# Patient Record
Sex: MALE | Race: WHITE | URBAN - METROPOLITAN AREA
[De-identification: names, ages, dates, MRNs, and addresses within clinical notes are randomized per-mention and may not be internally consistent; named-entity substitution may affect disease eponyms.]

---

## 2017-01-03 RX ORDER — ALPRAZOLAM 0.5 MG/1
TABLET ORAL
Qty: 60 TAB | Refills: 1 | OUTPATIENT
Start: 2017-01-03 | End: 2017-03-06 | Stop reason: SDUPTHER

## 2017-01-04 RX ORDER — LITHIUM CARBONATE 300 MG/1
600 CAPSULE ORAL
Qty: 60 CAP | Refills: 2 | Status: SHIPPED | OUTPATIENT
Start: 2017-01-04 | End: 2017-04-02 | Stop reason: SDUPTHER

## 2017-01-12 RX ORDER — ZOLPIDEM TARTRATE 10 MG/1
TABLET ORAL
Qty: 30 TAB | Refills: 1 | OUTPATIENT
Start: 2017-01-12 | End: 2017-03-11 | Stop reason: SDUPTHER

## 2017-02-01 ENCOUNTER — OFFICE VISIT (OUTPATIENT)
Dept: BEHAVIORAL/MENTAL HEALTH CLINIC | Age: 52
End: 2017-02-01

## 2017-02-01 VITALS — SYSTOLIC BLOOD PRESSURE: 137 MMHG | WEIGHT: 266 LBS | DIASTOLIC BLOOD PRESSURE: 73 MMHG | HEART RATE: 78 BPM

## 2017-02-01 DIAGNOSIS — F43.10 PTSD (POST-TRAUMATIC STRESS DISORDER): ICD-10-CM

## 2017-02-01 DIAGNOSIS — Z63.4 GRIEF AT LOSS OF CHILD: ICD-10-CM

## 2017-02-01 DIAGNOSIS — F43.21 GRIEF AT LOSS OF CHILD: ICD-10-CM

## 2017-02-01 DIAGNOSIS — F31.71 BIPOLAR DISORDER, IN PARTIAL REMISSION, MOST RECENT EPISODE HYPOMANIC (HCC): Primary | ICD-10-CM

## 2017-02-01 SDOH — SOCIAL STABILITY - SOCIAL INSECURITY: DISSAPEARANCE AND DEATH OF FAMILY MEMBER: Z63.4

## 2017-02-01 NOTE — MR AVS SNAPSHOT
Visit Information Date & Time Provider Department Dept. Phone Encounter #  
 2/1/2017  2:00 PM Jorge Mcqueen NP [address] [facility] [phone] [id number] Follow-up Instructions Return in about 3 months (around 5/1/2017). Upcoming Health Maintenance Date Due DTaP/Tdap/Td series (1 - Tdap) 1/16/1987 FOBT Q 1 YEAR AGE 50-75 1/16/2016 INFLUENZA AGE 9 TO ADULT 8/1/2016 Allergies as of 2/1/2017  Review Complete On: 2/1/2017 By: Jorge Mcqueen NP Severity Noted Reaction Type Reactions Tetracycline High 05/19/2015    Swelling Current Immunizations  Never Reviewed No immunizations on file. Not reviewed this visit You Were Diagnosed With   
  
 Codes Comments Bipolar disorder, in partial remission, most recent episode hypomanic ([facility] [address]    -  Primary ICD-10-CM: F31.71 
ICD-9-CM: 296.80 PTSD (post-traumatic stress disorder)     ICD-10-CM: F43.10 ICD-9-CM: 309.81 Grief at loss of child     ICD-10-CM: F43.21, Z63.4 ICD-9-CM: 309.0 Vitals BP Pulse Weight(growth percentile) Smoking Status 137/73 78 266 lb (120.7 kg) Never Smoker Vitals History Preferred Pharmacy Pharmacy Name Phone [facility] [address] [facility] [phone] Your Updated Medication List  
  
   
This list is accurate as of: 2/1/17  2:41 PM.  Always use your most recent med list.  
  
  
  
  
 ADVAIR DISKUS 100-50 mcg/dose diskus inhaler Generic drug:  fluticasone-salmeterol Take 1 Puff by inhalation every twelve (12) hours. ALPRAZolam 0.5 mg tablet Commonly known as:  XANAX  
TAKE ONE TABLET BY MOUTH TWICE DAILY AS NEEDED CLARITIN 10 mg tablet Generic drug:  loratadine Take 10 mg by mouth daily. CRESTOR 40 mg tablet Generic drug:  rosuvastatin Take 40 mg by mouth nightly. DULoxetine 60 mg capsule Commonly known as:  CYMBALTA TAKE ONE CAPSULE BY MOUTH TWICE DAILY DURAGESIC 50 mcg/hr PATCH Generic drug:  fentaNYL  
1 Patch by TransDERmal route every fourty-eight (48) hours. IMITREX 100 mg tablet Generic drug:  SUMAtriptan Take 100 mg by mouth once as needed for Migraine. lithium carbonate 300 mg capsule Take 2 Caps by mouth nightly. LYRICA 150 mg capsule Generic drug:  pregabalin Take 150 mg by mouth three (3) times daily. metoprolol tartrate 25 mg tablet Commonly known as:  LOPRESSOR Take  by mouth daily. MIRALAX 17 gram/dose powder Generic drug:  polyethylene glycol Take 17 g by mouth daily as needed. nitroglycerin 0.4 mg SL tablet Commonly known as:  NITROSTAT  
by SubLINGual route every five (5) minutes as needed for Chest Pain. oxyCODONE IR 15 mg immediate release tablet Commonly known as:  OXY-IR Take 15 mg by mouth three (3) times daily as needed for Pain. QUEtiapine  mg sr tablet Commonly known as:  SEROquel XR Take 1 Tab by mouth nightly. SYNTHROID 125 mcg tablet Generic drug:  levothyroxine Take  by mouth Daily (before breakfast). testosterone cypionate 200 mg/mL injection Commonly known as:  DEPOTESTOTERONE CYPIONATE  
by IntraMUSCular route every fourteen (14) days. TOPAMAX 50 mg tablet Generic drug:  topiramate Take  by mouth two (2) times a day. VITAMIN D3 2,000 unit Tab Generic drug:  cholecalciferol (vitamin D3) Take  by mouth.  
  
 zolpidem 10 mg tablet Commonly known as:  AMBIEN  
TAKE 1 TABLET BY MOUTH AT BEDTIME AS NEEDED Follow-up Instructions Return in about 3 months (around 5/1/2017). Introducing [facility]! Rachel Heredia introduces PWA patient portal. Now you can access parts of your medical record, email your doctor's office, and request medication refills online. 1. In your internet browser, go to [email or web address] 2. Click on the First Time User? Click Here link in the Sign In box. You will see the New Member Sign Up page. 3. Enter your [employer] Access Code exactly as it appears below. You will not need to use this code after youve completed the sign-up process. If you do not sign up before the expiration date, you must request a new code. · [employer] Access Code: [email or web address] Expires: 3/7/2017  3:29 PM 
 
4. Enter the last four digits of your Social Security Number (xxxx) and Date of Birth (mm/dd/yyyy) as indicated and click Submit. You will be taken to the next sign-up page. 5. Create a [employer] ID. This will be your [employer] login ID and cannot be changed, so think of one that is secure and easy to remember. 6. Create a [employer] password. You can change your password at any time. 7. Enter your Password Reset Question and Answer. This can be used at a later time if you forget your password. 8. Enter your e-mail address. You will receive e-mail notification when new information is available in [address] 9. Click Sign Up. You can now view and download portions of your medical record. 10. Click the Download Summary menu link to download a portable copy of your medical information. If you have questions, please visit the Frequently Asked Questions section of the [employer] website. Remember, [employer] is NOT to be used for urgent needs. For medical emergencies, dial 911. Now available from your iPhone and Android! Please provide this summary of care documentation to your next provider. If you have any questions after today's visit, please call [phone].

## 2017-02-01 NOTE — PROGRESS NOTES
CHIEF COMPLAINT:  Kenisha Shane is a 47 y.o. male and was seen today for follow-up of psychiatric condition and psychotropic medication management. HPI:    Tong Tran reports the following psychiatric symptoms:  depression, agitation, anxiety and hypomania. The symptoms have been present for months and are of moderate/high severity. The symptoms occur daily. Pt with sig psychosocial stressors that has been increasing symptoms. Pt wanting to discuss tx plan. FAMILY/SOCIAL HX: special needs son with seizures, random neck pain (go in on the 8th to see if he needs surgery)     REVIEW OF SYSTEMS:  Psychiatric:  depression, anxiety, hypomania  Appetite:no change from normal   Sleep: poor with DIMS (difficulty initiating & maintaining sleep) benefits with ambien   Neuro: Pain and tremors   MSK; arthritis     Visit Vitals    /73    Pulse 78    Wt 120.7 kg (266 lb)       Side Effects:  none    MENTAL STATUS EXAM:   Sensorium  oriented to time, place and person   Relations cooperative   Appearance:  age appropriate and casually dressed   Motor Behavior:  within normal limits   Speech:  loud and pressured   Thought Process: tangential   Thought Content free of delusions and free of hallucinations   Suicidal ideations no plan  and no intention   Homicidal ideations no plan  and no intention   Mood:  angry, anxious, depressed and irritable   Affect:  anxious   Memory recent  adequate   Memory remote:  adequate   Concentration:  adequate   Abstraction:  abstract   Insight:  good   Reliability good   Judgment:  fair and good     MEDICAL DECISION MAKING:  Problems addressed today:    ICD-10-CM ICD-9-CM    1. Bipolar disorder, in partial remission, most recent episode hypomanic ([facility] [address] F31.71 296.80    2. PTSD (post-traumatic stress disorder) F43.10 309.81    3. Grief at loss of child F43.21 309.0     Z63.4         Assessment:   Tong Tran is responding to treatment plan overall, even with sig psychosocial stressors.  Pt is tolerating increase in seroquel well. Discussed getting lithium level done 8-12 hours post dose, pt given lab labels. Pt lost lab labels and requested new labels. Pt reported cutting back on xanax use to 3-4 times a week and has seen is memory has gotten better. Pt also has been trying to decrease use of ambien and has been using a half a tab instead of 1 tab. Mp Wahll his son 2 year death anniversary and is wondering if this in addition to other son who has been having seizures and had to go to the same hospital that his other son passed away has increased his symptoms. Pt is wondering if he should go back to every week of ind therapy vs every other week. Pt discussed that his therapist talks a lot about psychodynamic issues but wondering if he could focus more on present coping strategies to help with all the stressful situations that are going in life currently. Provided support and encouragement. Plan:   1. Current Outpatient Prescriptions   Medication Sig Dispense Refill    zolpidem (AMBIEN) 10 mg tablet TAKE 1 TABLET BY MOUTH AT BEDTIME AS NEEDED 30 Tab 1    lithium carbonate 300 mg capsule Take 2 Caps by mouth nightly. 60 Cap 2    ALPRAZolam (XANAX) 0.5 mg tablet TAKE ONE TABLET BY MOUTH TWICE DAILY AS NEEDED 60 Tab 1    DULoxetine (CYMBALTA) 60 mg capsule TAKE ONE CAPSULE BY MOUTH TWICE DAILY  60 Cap 2    QUEtiapine SR (SEROQUEL XR) 400 mg sr tablet Take 1 Tab by mouth nightly. 30 Tab 2    topiramate (TOPAMAX) 50 mg tablet Take  by mouth two (2) times a day.  fluticasone-salmeterol (ADVAIR DISKUS) 100-50 mcg/dose diskus inhaler Take 1 Puff by inhalation every twelve (12) hours.  loratadine (CLARITIN) 10 mg tablet Take 10 mg by mouth daily.  fentaNYL (DURAGESIC) 50 mcg/hr PATCH 1 Patch by TransDERmal route every fourty-eight (48) hours.  pregabalin (LYRICA) 150 mg capsule Take 150 mg by mouth three (3) times daily.       oxyCODONE IR (OXY-IR) 15 mg immediate release tablet Take 15 mg by mouth three (3) times daily as needed for Pain.  testosterone cypionate (DEPOTESTOTERONE CYPIONATE) 200 mg/mL injection by IntraMUSCular route every fourteen (14) days.  levothyroxine (SYNTHROID) 125 mcg tablet Take  by mouth Daily (before breakfast).  cholecalciferol, vitamin D3, (VITAMIN D3) 2,000 unit tab Take  by mouth.  metoprolol (LOPRESSOR) 25 mg tablet Take  by mouth daily.  rosuvastatin (CRESTOR) 40 mg tablet Take 40 mg by mouth nightly.  SUMAtriptan (IMITREX) 100 mg tablet Take 100 mg by mouth once as needed for Migraine.  polyethylene glycol (MIRALAX) 17 gram/dose powder Take 17 g by mouth daily as needed.  nitroglycerin (NITROSTAT) 0.4 mg SL tablet by SubLINGual route every five (5) minutes as needed for Chest Pain.            medication changes made today: cont seroquel 400mg daily for mood stabalization, cont lithium 600mg for mood stabilization, cont xanax 0.5mg daily PRN anxiety , cont ambien 10mg PRN sleep    2. Counseling and coordination of care including instructions for treatment, risks/benefits, risk factor reduction and patient/family education. He agrees with the plan. Patient instructed to call with any side effects, questions or issues. 3.  Follow-up Disposition:  Return in about 3 months (around 5/1/2017). 4. ind therapy every other week, possibly every week     5.  Lithium level     2/1/2017  Neil Marin NP

## 2017-03-07 RX ORDER — ALPRAZOLAM 0.5 MG/1
TABLET ORAL
Qty: 60 TAB | Refills: 0 | OUTPATIENT
Start: 2017-03-07 | End: 2017-04-13 | Stop reason: SDUPTHER

## 2017-03-07 RX ORDER — QUETIAPINE 400 MG/1
400 TABLET, FILM COATED, EXTENDED RELEASE ORAL
Qty: 30 TAB | Refills: 2 | Status: SHIPPED | OUTPATIENT
Start: 2017-03-07 | End: 2017-06-02 | Stop reason: SDUPTHER

## 2017-03-08 ENCOUNTER — TELEPHONE (OUTPATIENT)
Dept: BEHAVIORAL/MENTAL HEALTH CLINIC | Age: 52
End: 2017-03-08

## 2017-03-08 NOTE — TELEPHONE ENCOUNTER
Padma Cuadra at the [facility], called and left VM yesterday evening. Said they received the refill for patient but wanted to verify something with the provider. [phone] is their #.

## 2017-03-13 RX ORDER — ZOLPIDEM TARTRATE 10 MG/1
TABLET ORAL
Qty: 30 TAB | Refills: 1 | OUTPATIENT
Start: 2017-03-13 | End: 2017-05-06 | Stop reason: SDUPTHER

## 2017-03-20 RX ORDER — DULOXETIN HYDROCHLORIDE 60 MG/1
CAPSULE, DELAYED RELEASE ORAL
Qty: 60 CAP | Refills: 0 | Status: SHIPPED | OUTPATIENT
Start: 2017-03-20 | End: 2017-04-19 | Stop reason: SDUPTHER

## 2017-04-03 RX ORDER — LITHIUM CARBONATE 300 MG/1
CAPSULE ORAL
Qty: 60 CAP | Refills: 0 | Status: SHIPPED | OUTPATIENT
Start: 2017-04-03 | End: 2017-05-07 | Stop reason: SDUPTHER

## 2017-04-17 RX ORDER — ALPRAZOLAM 0.5 MG/1
TABLET ORAL
Qty: 60 TAB | Refills: 0 | OUTPATIENT
Start: 2017-04-17 | End: 2017-05-31 | Stop reason: SDUPTHER

## 2017-05-08 RX ORDER — LITHIUM CARBONATE 300 MG/1
CAPSULE ORAL
Qty: 60 CAP | Refills: 2 | Status: SHIPPED | OUTPATIENT
Start: 2017-05-08 | End: 2017-08-01 | Stop reason: SDUPTHER

## 2017-05-08 RX ORDER — ZOLPIDEM TARTRATE 10 MG/1
10 TABLET ORAL
Qty: 30 TAB | Refills: 1 | OUTPATIENT
Start: 2017-05-12 | End: 2017-07-12 | Stop reason: SDUPTHER

## 2017-05-31 RX ORDER — ALPRAZOLAM 0.5 MG/1
TABLET ORAL
Qty: 60 TAB | Refills: 1 | OUTPATIENT
Start: 2017-05-31 | End: 2017-08-10 | Stop reason: SDUPTHER

## 2017-06-05 RX ORDER — QUETIAPINE 400 MG/1
TABLET, FILM COATED, EXTENDED RELEASE ORAL
Qty: 30 TAB | Refills: 0 | Status: SHIPPED | OUTPATIENT
Start: 2017-06-05 | End: 2017-07-02 | Stop reason: SDUPTHER

## 2017-07-03 RX ORDER — QUETIAPINE 400 MG/1
TABLET, FILM COATED, EXTENDED RELEASE ORAL
Qty: 30 TAB | Refills: 0 | Status: SHIPPED | OUTPATIENT
Start: 2017-07-03 | End: 2017-08-01 | Stop reason: SDUPTHER

## 2017-07-12 RX ORDER — ZOLPIDEM TARTRATE 10 MG/1
10 TABLET ORAL
Qty: 30 TAB | Refills: 2 | OUTPATIENT
Start: 2017-07-12 | End: 2017-10-11 | Stop reason: SDUPTHER

## 2017-07-25 RX ORDER — LITHIUM CARBONATE 300 MG/1
CAPSULE ORAL
Qty: 60 CAP | Refills: 0 | OUTPATIENT
Start: 2017-07-25

## 2017-07-25 RX ORDER — ALPRAZOLAM 0.5 MG/1
TABLET ORAL
Qty: 60 TAB | Refills: 0 | OUTPATIENT
Start: 2017-07-25

## 2017-08-02 RX ORDER — LITHIUM CARBONATE 300 MG/1
CAPSULE ORAL
Qty: 60 CAP | Refills: 2 | Status: SHIPPED | OUTPATIENT
Start: 2017-08-02 | End: 2017-10-11 | Stop reason: SDUPTHER

## 2017-08-02 RX ORDER — QUETIAPINE 400 MG/1
TABLET, FILM COATED, EXTENDED RELEASE ORAL
Qty: 30 TAB | Refills: 2 | Status: SHIPPED | OUTPATIENT
Start: 2017-08-02 | End: 2017-10-11 | Stop reason: SDUPTHER

## 2017-08-10 ENCOUNTER — OFFICE VISIT (OUTPATIENT)
Dept: BEHAVIORAL/MENTAL HEALTH CLINIC | Age: 52
End: 2017-08-10

## 2017-08-10 VITALS
HEART RATE: 92 BPM | DIASTOLIC BLOOD PRESSURE: 76 MMHG | SYSTOLIC BLOOD PRESSURE: 103 MMHG | WEIGHT: 256 LBS | BODY MASS INDEX: 31.17 KG/M2 | HEIGHT: 76 IN

## 2017-08-10 DIAGNOSIS — F31.71 BIPOLAR DISORDER, IN PARTIAL REMISSION, MOST RECENT EPISODE HYPOMANIC (HCC): Primary | ICD-10-CM

## 2017-08-10 DIAGNOSIS — F43.10 PTSD (POST-TRAUMATIC STRESS DISORDER): ICD-10-CM

## 2017-08-10 RX ORDER — TRAZODONE HYDROCHLORIDE 50 MG/1
50 TABLET ORAL
Qty: 30 TAB | Refills: 1 | Status: SHIPPED | OUTPATIENT
Start: 2017-08-10 | End: 2017-10-11 | Stop reason: SDUPTHER

## 2017-08-10 RX ORDER — ALPRAZOLAM 0.5 MG/1
TABLET ORAL
Qty: 60 TAB | Refills: 1 | Status: SHIPPED | OUTPATIENT
Start: 2017-08-10 | End: 2017-10-11 | Stop reason: SDUPTHER

## 2017-08-10 NOTE — MR AVS SNAPSHOT
Visit Information Date & Time Provider Department Dept. Phone Encounter #  
 8/10/2017 11:30 AM Ramila Bryant NP [address] [facility] [phone] [id number] Follow-up Instructions Return in about 8 weeks (around 10/5/2017). Upcoming Health Maintenance Date Due DTaP/Tdap/Td series (1 - Tdap) 1/16/1987 FOBT Q 1 YEAR AGE 50-75 1/16/2016 INFLUENZA AGE 9 TO ADULT 8/1/2017 Allergies as of 8/10/2017  Review Complete On: 8/10/2017 By: Ramila Bryant NP Severity Noted Reaction Type Reactions Tetracycline High 05/19/2015    Swelling Current Immunizations  Never Reviewed No immunizations on file. Not reviewed this visit You Were Diagnosed With   
  
 Codes Comments Bipolar disorder, in partial remission, most recent episode hypomanic ([facility] [address]    -  Primary ICD-10-CM: F31.71 
ICD-9-CM: 296.80 PTSD (post-traumatic stress disorder)     ICD-10-CM: F43.10 ICD-9-CM: 309.81 Vitals BP Pulse Height(growth percentile) Weight(growth percentile) BMI Smoking Status 103/76 92 6' 4\" (1.93 m) 256 lb (116.1 kg) 31.16 kg/m2 Never Smoker BMI and BSA Data Body Mass Index Body Surface Area  
 31.16 kg/m 2 2.5 m 2 Preferred Pharmacy Pharmacy Name Phone [facility] [address] [place] [address] [phone] Your Updated Medication List  
  
   
This list is accurate as of: 8/10/17 12:16 PM.  Always use your most recent med list.  
  
  
  
  
 ADVAIR DISKUS 100-50 mcg/dose diskus inhaler Generic drug:  fluticasone-salmeterol Take 1 Puff by inhalation every twelve (12) hours. ALPRAZolam 0.5 mg tablet Commonly known as:  XANAX  
TAKE 1 TABLET BY MOUTH BID AS NEEDED FOR ANXIETY. DO NOT TAKE WITH OTHER BENZOS, NARCOTICS OR ALCOHOL. CLARITIN 10 mg tablet Generic drug:  loratadine Take 10 mg by mouth daily. CRESTOR 40 mg tablet Generic drug:  rosuvastatin Take 40 mg by mouth nightly. DULoxetine 60 mg capsule Commonly known as:  CYMBALTA TAKE 1 CAPSULE BY MOUTH 2 TIMES DAILY DURAGESIC 50 mcg/hr PATCH Generic drug:  fentaNYL  
1 Patch by TransDERmal route every fourty-eight (48) hours. IMITREX 100 mg tablet Generic drug:  SUMAtriptan Take 100 mg by mouth once as needed for Migraine. lithium carbonate 300 mg capsule TAKE TWO CAPSULES BY MOUTH DAILY AT BEDTIME  
  
 LYRICA 150 mg capsule Generic drug:  pregabalin Take 150 mg by mouth three (3) times daily. metoprolol tartrate 25 mg tablet Commonly known as:  LOPRESSOR Take  by mouth daily. MIRALAX 17 gram/dose powder Generic drug:  polyethylene glycol Take 17 g by mouth daily as needed. nitroglycerin 0.4 mg SL tablet Commonly known as:  NITROSTAT  
by SubLINGual route every five (5) minutes as needed for Chest Pain. oxyCODONE IR 15 mg immediate release tablet Commonly known as:  OXY-IR Take 15 mg by mouth three (3) times daily as needed for Pain. QUEtiapine  mg sr tablet Commonly known as:  SEROquel XR  
TAKE ONE TABLET BY MOUTH AT BEDTIME  
  
 SYNTHROID 125 mcg tablet Generic drug:  levothyroxine Take  by mouth Daily (before breakfast). testosterone cypionate 200 mg/mL injection Commonly known as:  DEPOTESTOTERONE CYPIONATE  
by IntraMUSCular route every fourteen (14) days. TOPAMAX 50 mg tablet Generic drug:  topiramate Take  by mouth two (2) times a day. traZODone 50 mg tablet Commonly known as:  [place] Odor Take 1 Tab by mouth nightly. May take a second tab if not asleep in 30 minutes. VITAMIN D3 2,000 unit Tab Generic drug:  cholecalciferol (vitamin D3) Take  by mouth.  
  
 zolpidem 10 mg tablet Commonly known as:  AMBIEN Take 1 Tab by mouth nightly as needed for Sleep. Max Daily Amount: 10 mg. Do not take with narcotic pain medication, other benzo's or alcohol. Prescriptions Printed Refills ALPRAZolam (XANAX) 0.5 mg tablet 1 Sig: TAKE 1 TABLET BY MOUTH BID AS NEEDED FOR ANXIETY. DO NOT TAKE WITH OTHER BENZOS, NARCOTICS OR ALCOHOL. Class: Print Prescriptions Sent to Pharmacy Refills  
 traZODone (DESYREL) 50 mg tablet 1 Sig: Take 1 Tab by mouth nightly. May take a second tab if not asleep in 30 minutes. Class: Normal  
 Pharmacy: [address], [place] [address] JOSE MIGUEL LARA [facility] #: [phone] Route: Oral  
  
Follow-up Instructions Return in about 8 weeks (around 10/5/2017). Introducing [facility]! [employer] introduces [employer] patient portal. Now you can access parts of your medical record, email your doctor's office, and request medication refills online. 1. In your internet browser, go to [email or web address] 2. Click on the First Time User? Click Here link in the Sign In box. You will see the New Member Sign Up page. 3. Enter your [employer] Access Code exactly as it appears below. You will not need to use this code after youve completed the sign-up process. If you do not sign up before the expiration date, you must request a new code. · [employer] Access Code: [email or web address] Expires: 11/8/2017 12:16 PM 
 
4. Enter the last four digits of your Social Security Number (xxxx) and Date of Birth (mm/dd/yyyy) as indicated and click Submit. You will be taken to the next sign-up page. 5. Create a [employer] ID. This will be your [employer] login ID and cannot be changed, so think of one that is secure and easy to remember. 6. Create a [employer] password. You can change your password at any time. 7. Enter your Password Reset Question and Answer. This can be used at a later time if you forget your password. 8. Enter your e-mail address. You will receive e-mail notification when new information is available in [address] 9. Click Sign Up. You can now view and download portions of your medical record. 10. Click the Download Summary menu link to download a portable copy of your medical information. If you have questions, please visit the Frequently Asked Questions section of the [employer] website. Remember, [employer] is NOT to be used for urgent needs. For medical emergencies, dial 911. Now available from your iPhone and Android! Please provide this summary of care documentation to your next provider. If you have any questions after today's visit, please call [phone].

## 2017-08-10 NOTE — PROGRESS NOTES
CHIEF COMPLAINT:  Maxx Cuello is a 47 y.o. male and was seen today for follow-up of psychiatric condition and psychotropic medication management. HPI:    Stephani Rios reports the following psychiatric symptoms:  depression, agitation, anxiety, anette and hypomania. The symptoms have been present for months and are of moderate/high severity. The symptoms occur daily and more frequently secondary to a recent stressor. Pt reports some increased depression and \"fogginess\". Pt here today to review current treatment plan. FAMILY/SOCIAL HX: daughter dating a young man with sig social issues and he has been staying with pt and his family    REVIEW OF SYSTEMS:  Psychiatric:  depression, agitation, anxiety, hypomania  Appetite:decreased   Sleep: does not feel rested and fitful, by hx does not sleep well   Neuro: chronic pain    Visit Vitals    /76    Pulse 92    Ht 6' 4\" (1.93 m)    Wt 116.1 kg (256 lb)    BMI 31.16 kg/m2       Side Effects:  none    MENTAL STATUS EXAM:   Sensorium  oriented to time, place and person   Relations cooperative   Appearance:  age appropriate and casually dressed   Motor Behavior:  gait stable and within normal limits   Speech:  thick, dry mouth   Thought Process: goal directed   Thought Content free of delusions and free of hallucinations   Suicidal ideations no intention   Homicidal ideations no intention   Mood:  anxious, irritable and sad   Affect:  Anxious, depressed and irritable   Memory recent  adequate   Memory remote:  adequate   Concentration:  adequate   Abstraction:  abstract   Insight:  fair   Reliability fair   Judgment:  fair     MEDICAL DECISION MAKING:  Problems addressed today:    ICD-10-CM ICD-9-CM    1. Bipolar [facility], in partial remission, [facility] F31.71 296.80    2. PTSD (post-traumatic stress disorder) F43.10 309.81        Assessment:   Stephani Rios is responding to treatment overall.  He currently has some breakthrough symptoms that are high severity. Reviewed current meds and will continue with lithium and seroquel. He reported noticing some cognitive dulling from use of ambien. Reviewed risk vs benefit and agreed at this time to [facility]aley's (will decrease to 5 mg for a few nights) and will switch to trazodone. Reviewed dosing options. Discussed weaning off of klonopin in the near future and pt agrees. Last lithium level was 2/17 and will check at next visit. It was within therapeutic range (low end) at 0.6 at that time. Pt discussed current stressors. Reviewed healthy boundaries and explored problem solving strategies. Provided support and encouragement. Pt had not been in to see his therapist but recently re-started. This is a source of support for him. Encouraged him to consider marital counseling. Note: pt shared that his daughter's bf (cause of significant stressors) has requested an appt at this office, agreed that I would not see him for a new pt eval as this would conflict with work we are doing     Plan:   1. Current Outpatient Prescriptions   Medication Sig Dispense Refill    ALPRAZolam (XANAX) 0.5 mg tablet TAKE 1 TABLET BY MOUTH BID AS NEEDED FOR ANXIETY. DO NOT TAKE WITH OTHER BENZOS, NARCOTICS OR ALCOHOL. 60 Tab 1    traZODone (DESYREL) 50 mg tablet Take 1 Tab by mouth nightly. May take a second tab if not asleep in 30 minutes. 30 Tab 1    QUEtiapine SR (SEROQUEL XR) 400 mg sr tablet TAKE ONE TABLET BY MOUTH AT BEDTIME  30 Tab 2    lithium carbonate 300 mg capsule TAKE TWO CAPSULES BY MOUTH DAILY AT BEDTIME  60 Cap 2    DULoxetine (CYMBALTA) 60 mg capsule TAKE 1 CAPSULE BY MOUTH 2 TIMES DAILY 60 Cap 2    zolpidem (AMBIEN) 10 mg tablet Take 1 Tab by mouth nightly as needed for Sleep. Max Daily Amount: 10 mg. Do not take with narcotic pain medication, other benzo's or alcohol. 30 Tab 2    topiramate (TOPAMAX) 50 mg tablet Take  by mouth two (2) times a day.       fluticasone-salmeterol (ADVAIR DISKUS) 100-50 mcg/dose diskus inhaler Take 1 Puff by inhalation every twelve (12) hours.  loratadine (CLARITIN) 10 mg tablet Take 10 mg by mouth daily.  fentaNYL (DURAGESIC) 50 mcg/hr PATCH 1 Patch by TransDERmal route every fourty-eight (48) hours.  pregabalin (LYRICA) 150 mg capsule Take 150 mg by mouth three (3) times daily.  oxyCODONE IR (OXY-IR) 15 mg immediate release tablet Take 15 mg by mouth three (3) times daily as needed for Pain.  testosterone cypionate (DEPOTESTOTERONE CYPIONATE) 200 mg/mL injection by IntraMUSCular route every fourteen (14) days.  levothyroxine (SYNTHROID) 125 mcg tablet Take  by mouth Daily (before breakfast).  cholecalciferol, vitamin D3, (VITAMIN D3) 2,000 unit tab Take  by mouth.  metoprolol (LOPRESSOR) 25 mg tablet Take  by mouth daily.  rosuvastatin (CRESTOR) 40 mg tablet Take 40 mg by mouth nightly.  SUMAtriptan (IMITREX) 100 mg tablet Take 100 mg by mouth once as needed for Migraine.  polyethylene glycol (MIRALAX) 17 gram/dose powder Take 17 g by mouth daily as needed.  nitroglycerin (NITROSTAT) 0.4 mg SL tablet by SubLINGual route every five (5) minutes as needed for Chest Pain.            medication changes made today: cont seroquel 400mg daily for mood stabalization, cont lithium 600mg for mood stabilization, cont xanax 0.5mg daily PRN anxiety , wean off ambien 10mg PRN sleep, add trazodone 50 mg prn sleep    2. Counseling and coordination of care including instructions for treatment, risks/benefits, risk factor reduction and patient/family education. He agrees with the plan. Patient instructed to call with any side effects, questions or issues. 3.  Follow-up Disposition:  Return in about 8 weeks (around 10/5/2017).      4. Ind therapy    8/10/2017  Dandre Feliciano NP

## 2017-10-11 ENCOUNTER — OFFICE VISIT (OUTPATIENT)
Dept: BEHAVIORAL/MENTAL HEALTH CLINIC | Age: 52
End: 2017-10-11

## 2017-10-11 VITALS
HEIGHT: 76 IN | HEART RATE: 80 BPM | DIASTOLIC BLOOD PRESSURE: 77 MMHG | WEIGHT: 252 LBS | BODY MASS INDEX: 30.69 KG/M2 | SYSTOLIC BLOOD PRESSURE: 132 MMHG

## 2017-10-11 DIAGNOSIS — F43.10 PTSD (POST-TRAUMATIC STRESS DISORDER): ICD-10-CM

## 2017-10-11 DIAGNOSIS — F31.71 BIPOLAR DISORDER, IN PARTIAL REMISSION, MOST RECENT EPISODE HYPOMANIC (HCC): Primary | ICD-10-CM

## 2017-10-11 RX ORDER — LITHIUM CARBONATE 300 MG/1
CAPSULE ORAL
Qty: 60 CAP | Refills: 2 | Status: SHIPPED | OUTPATIENT
Start: 2017-10-11 | End: 2018-01-10 | Stop reason: SDUPTHER

## 2017-10-11 RX ORDER — QUETIAPINE 400 MG/1
TABLET, FILM COATED, EXTENDED RELEASE ORAL
Qty: 30 TAB | Refills: 2 | Status: SHIPPED | OUTPATIENT
Start: 2017-10-11 | End: 2018-01-10 | Stop reason: SDUPTHER

## 2017-10-11 RX ORDER — ZOLPIDEM TARTRATE 10 MG/1
10 TABLET ORAL
Qty: 30 TAB | Refills: 1 | Status: SHIPPED | OUTPATIENT
Start: 2017-10-11 | End: 2018-01-10 | Stop reason: SDUPTHER

## 2017-10-11 RX ORDER — TRAZODONE HYDROCHLORIDE 150 MG/1
150 TABLET ORAL
Qty: 30 TAB | Refills: 1 | Status: SHIPPED | OUTPATIENT
Start: 2017-10-11 | End: 2018-01-10

## 2017-10-11 RX ORDER — DULOXETIN HYDROCHLORIDE 30 MG/1
30 CAPSULE, DELAYED RELEASE ORAL DAILY
Qty: 30 CAP | Refills: 2 | Status: SHIPPED | OUTPATIENT
Start: 2017-10-11 | End: 2018-01-10 | Stop reason: SDUPTHER

## 2017-10-11 RX ORDER — ALPRAZOLAM 0.5 MG/1
TABLET ORAL
Qty: 60 TAB | Refills: 1 | Status: SHIPPED | OUTPATIENT
Start: 2017-10-11 | End: 2017-12-20 | Stop reason: SDUPTHER

## 2017-10-11 RX ORDER — DULOXETIN HYDROCHLORIDE 60 MG/1
60 CAPSULE, DELAYED RELEASE ORAL DAILY
Qty: 60 CAP | Refills: 2 | Status: SHIPPED | OUTPATIENT
Start: 2017-10-11 | End: 2018-01-10 | Stop reason: SDUPTHER

## 2017-10-11 NOTE — MR AVS SNAPSHOT
Visit Information Date & Time Provider Department Dept. Phone Encounter #  
 10/11/2017  1:30 PM Charleen Alexander, NP [address] [facility] [phone] [id number] Follow-up Instructions Return in about 3 months (around 1/11/2018). Upcoming Health Maintenance Date Due DTaP/Tdap/Td series (1 - Tdap) 1/16/1987 FOBT Q 1 YEAR AGE 50-75 1/16/2016 INFLUENZA AGE 9 TO ADULT 8/1/2017 Allergies as of 10/11/2017  Review Complete On: 10/11/2017 By: Charleen Alexander NP Severity Noted Reaction Type Reactions Tetracycline High 05/19/2015    Swelling Current Immunizations  Never Reviewed No immunizations on file. Not reviewed this visit You Were Diagnosed With   
  
 Codes Comments Bipolar disorder, in partial remission, most recent episode hypomanic ([facility] [address]    -  Primary ICD-10-CM: F31.71 
ICD-9-CM: 296.80 PTSD (post-traumatic stress disorder)     ICD-10-CM: F43.10 ICD-9-CM: 309.81 Vitals BP Pulse Height(growth percentile) Weight(growth percentile) BMI Smoking Status 132/77 80 6' 4\" (1.93 m) 252 lb (114.3 kg) 30.67 kg/m2 Never Smoker BMI and BSA Data Body Mass Index Body Surface Area  
 30.67 kg/m 2 2.48 m 2 Preferred Pharmacy Pharmacy Name Phone [facility] [address], [address] [facility] [phone] Your Updated Medication List  
  
   
This list is accurate as of: 10/11/17  2:07 PM.  Always use your most recent med list.  
  
  
  
  
 ADVAIR DISKUS 100-50 mcg/dose diskus inhaler Generic drug:  fluticasone-salmeterol Take 1 Puff by inhalation every twelve (12) hours. ALPRAZolam 0.5 mg tablet Commonly known as:  XANAX  
TAKE 1 TABLET BY MOUTH BID AS NEEDED FOR ANXIETY. DO NOT TAKE WITH OTHER BENZOS, NARCOTICS OR ALCOHOL. CLARITIN 10 mg tablet Generic drug:  loratadine Take 10 mg by mouth daily. CRESTOR 40 mg tablet Generic drug:  rosuvastatin Take 40 mg by mouth nightly. * DULoxetine 60 mg capsule Commonly known as:  CYMBALTA Take 1 Cap by mouth daily. * DULoxetine 30 mg capsule Commonly known as:  CYMBALTA Take 1 Cap by mouth daily. Take with 60 mg cap for total dose of 90 mg. DURAGESIC 50 mcg/hr PATCH Generic drug:  fentaNYL  
1 Patch by TransDERmal route every fourty-eight (48) hours. IMITREX 100 mg tablet Generic drug:  SUMAtriptan Take 100 mg by mouth once as needed for Migraine. lithium carbonate 300 mg capsule TAKE TWO CAPSULES BY MOUTH DAILY AT BEDTIME  
  
 LYRICA 150 mg capsule Generic drug:  pregabalin Take 150 mg by mouth three (3) times daily. metoprolol tartrate 25 mg tablet Commonly known as:  LOPRESSOR Take  by mouth daily. MIRALAX 17 gram/dose powder Generic drug:  polyethylene glycol Take 17 g by mouth daily as needed. nitroglycerin 0.4 mg SL tablet Commonly known as:  NITROSTAT  
by SubLINGual route every five (5) minutes as needed for Chest Pain. oxyCODONE IR 15 mg immediate release tablet Commonly known as:  OXY-IR Take 15 mg by mouth three (3) times daily as needed for Pain. QUEtiapine  mg sr tablet Commonly known as:  SEROquel XR  
TAKE ONE TABLET BY MOUTH AT BEDTIME  
  
 SYNTHROID 125 mcg tablet Generic drug:  levothyroxine Take  by mouth Daily (before breakfast). testosterone cypionate 200 mg/mL injection Commonly known as:  DEPOTESTOTERONE CYPIONATE  
by IntraMUSCular route every fourteen (14) days. TOPAMAX 50 mg tablet Generic drug:  topiramate Take  by mouth two (2) times a day. traZODone 150 mg tablet Commonly known as:  Emre Solares Take 1 Tab by mouth nightly as needed for Sleep. May take a second tab if not asleep in 30 minutes. VITAMIN D3 2,000 unit Tab Generic drug:  cholecalciferol (vitamin D3) Take  by mouth.  
  
 zolpidem 10 mg tablet Commonly known as:  AMBIEN  
 Take 1 Tab by mouth nightly as needed for Sleep. Max Daily Amount: 10 mg. Do not take with narcotic pain medication, other benzo's or alcohol. * Notice: This list has 2 medication(s) that are the same as other medications prescribed for you. Read the directions carefully, and ask your doctor or other care provider to review them with you. Prescriptions Printed Refills ALPRAZolam (XANAX) 0.5 mg tablet 1 Sig: TAKE 1 TABLET BY MOUTH BID AS NEEDED FOR ANXIETY. DO NOT TAKE WITH OTHER BENZOS, NARCOTICS OR ALCOHOL. Class: Print  
 zolpidem (AMBIEN) 10 mg tablet 1 Sig: Take 1 Tab by mouth nightly as needed for Sleep. Max Daily Amount: 10 mg. Do not take with narcotic pain medication, other benzo's or alcohol. Class: Print Route: Oral  
  
Prescriptions Sent to Pharmacy Refills  
 traZODone (DESYREL) 150 mg tablet 1 Sig: Take 1 Tab by mouth nightly as needed for Sleep. May take a second tab if not asleep in 30 minutes. Class: Normal  
 Pharmacy: [address] Ph #: [phone] Route: Oral  
 QUEtiapine SR (SEROQUEL XR) 400 mg sr tablet 2 Sig: TAKE ONE TABLET BY MOUTH AT BEDTIME Class: Normal  
 Pharmacy: [address] [place] [address] Ph #: [phone]  
 lithium carbonate 300 mg capsule 2 Sig: TAKE TWO CAPSULES BY MOUTH DAILY AT BEDTIME Class: Normal  
 Pharmacy: [address] Ph #: [phone] DULoxetine (CYMBALTA) 60 mg capsule 2 Sig: Take 1 Cap by mouth daily. Class: Normal  
 Pharmacy: [address] Ph #: [phone] Route: Oral  
 DULoxetine (CYMBALTA) 30 mg capsule 2 Sig: Take 1 Cap by mouth daily.  Take with 60 mg cap for total dose of 90 mg.  
 Class: Normal  
 Pharmacy: [facility] [address] [place] [address], [address] [place] [address] Hwy  #: [phone] Route: Oral  
  
Follow-up Instructions Return in about 3 months (around 1/11/2018). Introducing [facility]! Mir Chavez introduces "[employer] patient portal. Now you can access parts of your medical record, email your doctor's office, and request medication refills online. 1. In your internet browser, go to [email or web address] 2. Click on the First Time User? Click Here link in the Sign In box. You will see the New Member Sign Up page. 3. Enter your "[employer] Access Code exactly as it appears below. You will not need to use this code after youve completed the sign-up process. If you do not sign up before the expiration date, you must request a new code. · "[employer] Access Code: [email or web address] Expires: 11/8/2017 12:16 PM 
 
4. Enter the last four digits of your Social Security Number (xxxx) and Date of Birth (mm/dd/yyyy) as indicated and click Submit. You will be taken to the next sign-up page. 5. Create a "[employer] ID. This will be your "[employer] login ID and cannot be changed, so think of one that is secure and easy to remember. 6. Create a "[employer] password. You can change your password at any time. 7. Enter your Password Reset Question and Answer. This can be used at a later time if you forget your password. 8. Enter your e-mail address. You will receive e-mail notification when new information is available in [address] 9. Click Sign Up. You can now view and download portions of your medical record. 10. Click the Download Summary menu link to download a portable copy of your medical information. If you have questions, please visit the Frequently Asked Questions section of the "[employer] website. Remember, "[employer] is NOT to be used for urgent needs. For medical emergencies, dial 911. Now available from your iPhone and Android! Please provide this summary of care documentation to your next provider. If you have any questions after today's visit, please call [phone].

## 2017-10-11 NOTE — PROGRESS NOTES
CHIEF COMPLAINT:  Maxine Stafford is a 47 y.o. male and was seen today for follow-up of psychiatric condition and psychotropic medication management. HPI:    Hali Tejada reports the following psychiatric symptoms:  depression, agitation, anxiety and hypomania. The symptoms have been present for months and are of moderate/high severity. Pt reports depression has been fairly stable. He has had ongoing anxiety. No evidence of hypomania/anette at this time. The anxiety symptoms occur more frequently at this time. Pt here to review current treatment plan. 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    Visit Vitals    /77    Pulse 80    Ht 6' 4\" (1.93 m)    Wt 114.3 kg (252 lb)    BMI 30.67 kg/m2       Side Effects:  none    MENTAL STATUS EXAM:   Sensorium  oriented to time, place and person   Relations cooperative   Appearance:  age appropriate and casually dressed   Motor Behavior:  gait stable and within normal limits   Speech:  normal volume   Thought Process: goal directed   Thought Content free of delusions and free of hallucinations   Suicidal ideations no intention   Homicidal ideations no intention   Mood:  anxious and within normal limits   Affect:  anxious and wnl   Memory recent  adequate   Memory remote:  adequate   Concentration:  adequate   Abstraction:  abstract   Insight:  fair and good   Reliability good and fair   Judgment:  fair and good     MEDICAL DECISION MAKING:  Problems addressed today:    ICD-10-CM ICD-9-CM    1. Bipolar disorder, in partial remission, most [facility] episode [facility] F31.71 296.80    2.  PTSD (post-traumatic stress disorder) F43.10 309.81        Assessment:   Hali Tejada is responding to treatment, symptoms are improving and fairly stable overall. Pt reports he has been considering reducing medications. Mood is stable and he is doing well with lithium, cymbalta and seroquel. Pt would like to try to wean off cymbalta (or at least lower dose) at this time. Agreed to decrease to 90 mg. Last lithium level on 2/24/17 was within therapeutic range at 0.6. If symptoms escalate with changes to cymbalta can increase lithium dose. He did switch trazodone for [employer]. He did not benefit from trazodone 50 mg so will try 150 mg. He likes trying to decrease use of ambien when he can so has been alternating trazodone with [employer]. He does not sleep as well with trazodone but will try higher dose. Discussed guidelines for safe use of benzo's with a fentanyl patch. Pt plans to d/w his pain specialist and/or pharmacist. He reports he is not using alprazolam as frequently. Pt discussed current stressors. He feels positive about enhanced coping strategies. Provided support and answered questions. Plan:   1. Current Outpatient Prescriptions   Medication Sig Dispense Refill    traZODone (DESYREL) 150 mg tablet Take 1 Tab by mouth nightly as needed for Sleep. May take a second tab if not asleep in 30 minutes. 30 Tab 1    ALPRAZolam (XANAX) 0.5 mg tablet TAKE 1 TABLET BY MOUTH BID AS NEEDED FOR ANXIETY. DO NOT TAKE WITH OTHER BENZOS, NARCOTICS OR ALCOHOL. 60 Tab 1    zolpidem (AMBIEN) 10 mg tablet Take 1 Tab by mouth nightly as needed for Sleep. Max Daily Amount: 10 mg. Do not take with narcotic pain medication, other benzo's or alcohol. 30 Tab 1    QUEtiapine SR (SEROQUEL XR) 400 mg sr tablet TAKE ONE TABLET BY MOUTH AT BEDTIME 30 Tab 2    lithium carbonate 300 mg capsule TAKE TWO CAPSULES BY MOUTH DAILY AT BEDTIME 60 Cap 2    DULoxetine (CYMBALTA) 60 mg capsule Take 1 Cap by mouth daily. 60 Cap 2    DULoxetine (CYMBALTA) 30 mg capsule Take 1 Cap by mouth daily.  Take with 60 mg cap for total dose of 90 mg. 30 Cap 2    topiramate (TOPAMAX) 50 mg tablet Take  by mouth two (2) times a day.  fluticasone-salmeterol (ADVAIR DISKUS) 100-50 mcg/dose diskus inhaler Take 1 Puff by inhalation every twelve (12) hours.  loratadine (CLARITIN) 10 mg tablet Take 10 mg by mouth daily.  fentaNYL (DURAGESIC) 50 mcg/hr PATCH 1 Patch by TransDERmal route every fourty-eight (48) hours.  pregabalin (LYRICA) 150 mg capsule Take 150 mg by mouth three (3) times daily.  oxyCODONE IR (OXY-IR) 15 mg immediate release tablet Take 15 mg by mouth three (3) times daily as needed for Pain.  testosterone cypionate (DEPOTESTOTERONE CYPIONATE) 200 mg/mL injection by IntraMUSCular route every fourteen (14) days.  levothyroxine (SYNTHROID) 125 mcg tablet Take  by mouth Daily (before breakfast).  cholecalciferol, vitamin D3, (VITAMIN D3) 2,000 unit tab Take  by mouth.  metoprolol (LOPRESSOR) 25 mg tablet Take  by mouth daily.  rosuvastatin (CRESTOR) 40 mg tablet Take 40 mg by mouth nightly.  SUMAtriptan (IMITREX) 100 mg tablet Take 100 mg by mouth once as needed for Migraine.  polyethylene glycol (MIRALAX) 17 gram/dose powder Take 17 g by mouth daily as needed.  nitroglycerin (NITROSTAT) 0.4 mg SL tablet by SubLINGual route every five (5) minutes as needed for Chest Pain.            medication changes made today: cont seroquel 400mg daily for mood stabalization, cont lithium 600mg for mood stabilization, cont xanax 0.5mg daily PRN anxiety , alternate ambien 10mg PRN sleep with trazodone, decrease cymbalta 90 mg    2. Counseling and coordination of care including instructions for treatment, risks/benefits, risk factor reduction and patient/family education. He agrees with the plan. Patient instructed to call with any side effects, questions or issues. 3.  Follow-up Disposition:  Return in about 3 months (around 1/11/2018). 4. Ind therapy    5.  Mood Disorder Questionnaire and HAM-A at next f/u visit    10/11/2017  Channing Alegria NP

## 2017-10-30 RX ORDER — TRAZODONE HYDROCHLORIDE 50 MG/1
TABLET ORAL
Qty: 30 TAB | Refills: 0 | OUTPATIENT
Start: 2017-10-30

## 2017-12-20 DIAGNOSIS — F31.71 BIPOLAR DISORDER, IN PARTIAL REMISSION, MOST RECENT EPISODE HYPOMANIC (HCC): Primary | ICD-10-CM

## 2017-12-21 RX ORDER — ALPRAZOLAM 0.5 MG/1
0.5 TABLET ORAL
Qty: 60 TAB | Refills: 1 | OUTPATIENT
Start: 2017-12-21 | End: 2018-02-16 | Stop reason: SDUPTHER

## 2018-01-10 ENCOUNTER — OFFICE VISIT (OUTPATIENT)
Dept: BEHAVIORAL/MENTAL HEALTH CLINIC | Age: 53
End: 2018-01-10

## 2018-01-10 VITALS
WEIGHT: 252 LBS | SYSTOLIC BLOOD PRESSURE: 118 MMHG | HEART RATE: 108 BPM | BODY MASS INDEX: 30.69 KG/M2 | HEIGHT: 76 IN | DIASTOLIC BLOOD PRESSURE: 77 MMHG

## 2018-01-10 DIAGNOSIS — F31.71 BIPOLAR DISORDER, IN PARTIAL REMISSION, MOST RECENT EPISODE HYPOMANIC (HCC): Primary | ICD-10-CM

## 2018-01-10 DIAGNOSIS — F43.10 PTSD (POST-TRAUMATIC STRESS DISORDER): ICD-10-CM

## 2018-01-10 DIAGNOSIS — F43.21 COMPLICATED GRIEF: ICD-10-CM

## 2018-01-10 RX ORDER — QUETIAPINE 400 MG/1
TABLET, FILM COATED, EXTENDED RELEASE ORAL
Qty: 30 TAB | Refills: 2 | Status: SHIPPED | OUTPATIENT
Start: 2018-01-10 | End: 2018-04-24 | Stop reason: SDUPTHER

## 2018-01-10 RX ORDER — DULOXETIN HYDROCHLORIDE 60 MG/1
60 CAPSULE, DELAYED RELEASE ORAL DAILY
Qty: 60 CAP | Refills: 2 | Status: SHIPPED | OUTPATIENT
Start: 2018-01-10 | End: 2018-07-10 | Stop reason: SDUPTHER

## 2018-01-10 RX ORDER — LITHIUM CARBONATE 300 MG/1
CAPSULE ORAL
Qty: 60 CAP | Refills: 2 | Status: SHIPPED | OUTPATIENT
Start: 2018-01-10 | End: 2018-04-10 | Stop reason: SDUPTHER

## 2018-01-10 RX ORDER — DULOXETIN HYDROCHLORIDE 30 MG/1
30 CAPSULE, DELAYED RELEASE ORAL DAILY
Qty: 30 CAP | Refills: 2 | Status: SHIPPED | OUTPATIENT
Start: 2018-01-10 | End: 2018-04-10 | Stop reason: SDUPTHER

## 2018-01-10 RX ORDER — ZOLPIDEM TARTRATE 10 MG/1
10 TABLET ORAL
Qty: 30 TAB | Refills: 1 | Status: SHIPPED | OUTPATIENT
Start: 2018-01-10 | End: 2018-03-13 | Stop reason: SDUPTHER

## 2018-01-10 NOTE — PROGRESS NOTES
CHIEF COMPLAINT:  Shelby Harmon is a 47 y.o. male and was seen today for follow-up of psychiatric condition and psychotropic medication management. HPI:    Clarence Florez reports the following psychiatric symptoms:  depression, anxiety, anette and hypomania. The symptoms have been present for months and are of moderate/high severity. The symptoms occur daily at this time and depression has been severe. Pt reports numerous stressors. Pt reports benefit from current medication. He states it has been hard to evaluate due to current stressors. Pt here today to review current treatment plan. FAMILY/SOCIAL HX: daughter (age 22) got  in secret    REVIEW OF SYSTEMS:  Psychiatric:  depression, anxiety, hypomania/anette  Appetite:decreased   Sleep: poor with DIMS (difficulty initiating & maintaining sleep)   Neuro: chronic pain    Visit Vitals    /77    Pulse (!) 108    Ht 6' 4\" (1.93 m)    Wt 114.3 kg (252 lb)    BMI 30.67 kg/m2       Side Effects:  none    MENTAL STATUS EXAM:   Sensorium  oriented to time, place and person   Relations cooperative   Appearance:  age appropriate and casually dressed   Motor Behavior:  gait stable and within normal limits   Speech:  normal volume   Thought Process: goal directed   Thought Content free of delusions and free of hallucinations   Suicidal ideations no intention currently, had some SI during height of stress from daughter   Homicidal ideations no intention   Mood:  anxious and depressed, pt reports some nights of hypomania and staying up doing hobby   Affect:  anxious and depressed   Memory recent  adequate   Memory remote:  adequate   Concentration:  adequate   Abstraction:  abstract   Insight:  fair and limited   Reliability fair   Judgment:  Fair and limited     MEDICAL DECISION MAKING:  Problems addressed today:    ICD-10-CM ICD-9-CM    1.  Bipolar disorder, in partial remission, most recent episode hypomanic ([facility]) F31.71 296.80 zolpidem (AMBIEN) 10 mg tablet      QUEtiapine SR (SEROQUEL XR) 400 mg sr tablet      lithium carbonate 300 mg capsule      DULoxetine (CYMBALTA) 60 mg capsule      DULoxetine (CYMBALTA) 30 mg capsule      LITHIUM      METABOLIC PANEL, COMPREHENSIVE      CBC W/O DIFF   2. PTSD (post-traumatic stress disorder) F43.10 309.81 zolpidem (AMBIEN) 10 mg tablet      QUEtiapine SR (SEROQUEL XR) 400 mg sr tablet      lithium carbonate 300 mg capsule      DULoxetine (CYMBALTA) 60 mg capsule      DULoxetine (CYMBALTA) 30 mg capsule      LITHIUM      METABOLIC PANEL, COMPREHENSIVE      CBC W/O DIFF   3. Complicated grief Q46.98 309.0     Z63.4         Assessment:   Teresa Ashley is responding to treatment overall. He has had some breakthrough depression, anxiety and hypomanic symptoms. Pt overall reports benefit from his long term treatment plan. Since treating pt hypomanic/manic symptoms have not been apparent but he reports episodes of getting involved in a hobby and staying up till early in am to complete tasks/projects. Unclear if pt has more of an atypical bipolar disorder. When he started treatment here at this office he was on current meds and he has not required inpatient hospitalization. He has had some periods of high stress with intermittent SI but typically he is able to return to baseline (baseline is mild/modreate levels of depression and anxiety). Pt did not do well with use of Trazodone so will DC. Reviewed current meds and no additional changes to scheduled meds required. Recently decreased Cymbalta dose but due to current severity of symptoms (mod to high) will not make any additional changes. Will get a lithium level and associated labs. Last lithium level on 2/24/17 was just in therapeutic range at 0.6. Pt has a past hx of severe trauma and has used benzo's for a long period of time. Have worked to reduce from daily use to PRN but pt typically experiences stressors and uses PRN's for anxiety management/coping.  Reviewed importance of using lowest effective dose and only PRN again today. Reviewed rationale for guidelines and risk of using with ongoing narcotic medications as well. Pt verbalized understanding and stated he would work on decreasing use. He is in ind therapy but has taken breaks in the past. He is getting re-started in Feb and will also be starting marital counseling which should help pt build coping strategies. Discussed my leaving this practice and a transition plan. He would like to continue treatment here and is interested in working with one of the physicians. Provided support and reinforced treatment plan. Plan:   1. Current Outpatient Prescriptions   Medication Sig Dispense Refill    zolpidem (AMBIEN) 10 mg tablet Take 1 Tab by mouth nightly as needed for Sleep. Max Daily Amount: 10 mg. Do not take with narcotic pain medication, other benzo's or alcohol. 30 Tab 1    QUEtiapine SR (SEROQUEL XR) 400 mg sr tablet TAKE ONE TABLET BY MOUTH AT BEDTIME 30 Tab 2    lithium carbonate 300 mg capsule TAKE TWO CAPSULES BY MOUTH DAILY AT BEDTIME 60 Cap 2    DULoxetine (CYMBALTA) 60 mg capsule Take 1 Cap by mouth daily. 60 Cap 2    DULoxetine (CYMBALTA) 30 mg capsule Take 1 Cap by mouth daily. Take with 60 mg cap for total dose of 90 mg. 30 Cap 2    ALPRAZolam (XANAX) 0.5 mg tablet Take 1 Tab by mouth two (2) times daily as needed for Anxiety (Do not take with other benzo's, narcotics or alcohol. ). Max Daily Amount: 1 mg. 60 Tab 1    topiramate (TOPAMAX) 50 mg tablet Take  by mouth two (2) times a day.  fluticasone-salmeterol (ADVAIR DISKUS) 100-50 mcg/dose diskus inhaler Take 1 Puff by inhalation every twelve (12) hours.  loratadine (CLARITIN) 10 mg tablet Take 10 mg by mouth daily.  fentaNYL (DURAGESIC) 50 mcg/hr PATCH 1 Patch by TransDERmal route every fourty-eight (48) hours.  pregabalin (LYRICA) 150 mg capsule Take 150 mg by mouth three (3) times daily.       oxyCODONE IR (OXY-IR) 15 mg immediate release tablet Take 15 mg by mouth three (3) times daily as needed for Pain.  testosterone cypionate (DEPOTESTOTERONE CYPIONATE) 200 mg/mL injection by IntraMUSCular route every fourteen (14) days.  levothyroxine (SYNTHROID) 125 mcg tablet Take  by mouth Daily (before breakfast).  cholecalciferol, vitamin D3, (VITAMIN D3) 2,000 unit tab Take  by mouth.  metoprolol (LOPRESSOR) 25 mg tablet Take  by mouth daily.  rosuvastatin (CRESTOR) 40 mg tablet Take 40 mg by mouth nightly.  SUMAtriptan (IMITREX) 100 mg tablet Take 100 mg by mouth once as needed for Migraine.  polyethylene glycol (MIRALAX) 17 gram/dose powder Take 17 g by mouth daily as needed.  nitroglycerin (NITROSTAT) 0.4 mg SL tablet by SubLINGual route every five (5) minutes as needed for Chest Pain.            medication changes made today: cont seroquel 400mg daily for mood stabalization, cont lithium 600mg for mood stabilization, cont xanax 0.5mg daily PRN anxiety , ambien 5-10mg PRN sleep, cont cymbalta 90 mg for depression    2. Counseling and coordination of care including instructions for treatment, risks/benefits, risk factor reduction and patient/family education. He agrees with the plan. Patient instructed to call with any side effects, questions or issues. 3.  Follow-up Disposition:  Return in about 3 months (around 4/10/2018). 4. Lithium levels and associated labs    5.  Ind therapy and Marital counseling    1/10/2018  Manuel Holman NP

## 2018-02-14 DIAGNOSIS — F31.71 BIPOLAR DISORDER, IN PARTIAL REMISSION, MOST RECENT EPISODE HYPOMANIC (HCC): ICD-10-CM

## 2018-02-14 RX ORDER — ALPRAZOLAM 0.5 MG/1
TABLET ORAL
Qty: 60 TAB | Refills: 0 | OUTPATIENT
Start: 2018-02-14

## 2018-02-19 ENCOUNTER — DOCUMENTATION ONLY (OUTPATIENT)
Dept: BEHAVIORAL/MENTAL HEALTH CLINIC | Age: 53
End: 2018-02-19

## 2018-02-19 NOTE — PROGRESS NOTES
Pt requested refill today of PRN Alprazolam. Have been working with pt on using coping strategies vs using prn medication daily. Checked  and pt appears to continue to use daily. Will discuss at his next OV due to risk associated with concurrent narcotic use for chronic pain. Sandy Bautista

## 2018-03-13 DIAGNOSIS — F43.10 PTSD (POST-TRAUMATIC STRESS DISORDER): ICD-10-CM

## 2018-03-13 DIAGNOSIS — F31.71 BIPOLAR DISORDER, IN PARTIAL REMISSION, MOST RECENT EPISODE HYPOMANIC (HCC): ICD-10-CM

## 2018-03-14 RX ORDER — ZOLPIDEM TARTRATE 10 MG/1
TABLET ORAL
Qty: 30 TAB | Refills: 1 | OUTPATIENT
Start: 2018-03-14 | End: 2018-05-12 | Stop reason: SDUPTHER

## 2018-04-10 ENCOUNTER — OFFICE VISIT (OUTPATIENT)
Dept: BEHAVIORAL/MENTAL HEALTH CLINIC | Age: 53
End: 2018-04-10

## 2018-04-10 VITALS
HEART RATE: 81 BPM | WEIGHT: 247.8 LBS | HEIGHT: 76 IN | SYSTOLIC BLOOD PRESSURE: 143 MMHG | DIASTOLIC BLOOD PRESSURE: 74 MMHG | BODY MASS INDEX: 30.18 KG/M2

## 2018-04-10 DIAGNOSIS — F43.10 PTSD (POST-TRAUMATIC STRESS DISORDER): ICD-10-CM

## 2018-04-10 DIAGNOSIS — F43.21 COMPLICATED GRIEF: ICD-10-CM

## 2018-04-10 DIAGNOSIS — F31.71 BIPOLAR DISORDER, IN PARTIAL REMISSION, MOST RECENT EPISODE HYPOMANIC (HCC): Primary | ICD-10-CM

## 2018-04-10 RX ORDER — LITHIUM CARBONATE 300 MG/1
CAPSULE ORAL
Qty: 60 CAP | Refills: 2 | Status: SHIPPED | OUTPATIENT
Start: 2018-04-10 | End: 2018-07-10 | Stop reason: SDUPTHER

## 2018-04-10 RX ORDER — DULOXETIN HYDROCHLORIDE 30 MG/1
30 CAPSULE, DELAYED RELEASE ORAL DAILY
Qty: 30 CAP | Refills: 2 | Status: SHIPPED | OUTPATIENT
Start: 2018-04-10 | End: 2018-07-05 | Stop reason: SDUPTHER

## 2018-04-10 RX ORDER — ALPRAZOLAM 0.5 MG/1
TABLET ORAL
Qty: 60 TAB | Refills: 0 | Status: SHIPPED | OUTPATIENT
Start: 2018-04-10 | End: 2018-04-26 | Stop reason: SDUPTHER

## 2018-04-10 NOTE — PROGRESS NOTES
CHIEF COMPLAINT:  Tawny Ha is a 47 y.o. male and was seen today for follow-up of psychiatric condition and psychotropic medication management. HPI:    Dasia Hussein reports the following psychiatric symptoms:  depression and anxiety. The symptoms have been present for months and are of moderate severity. The symptoms occur somewhat less severely and less frequently. PHQ-9: 7, mild depression  HAM-A: 15, mild anxiety       FAMILY/SOCIAL HX: stressors r/t daughter's dysfunctional marriage    REVIEW OF SYSTEMS:  Psychiatric:  depression, anxiety  Appetite:improving   Sleep: improving   Neuro: chronic pain    Visit Vitals    /74    Pulse 81    Ht 6' 4\" (1.93 m)    Wt 112.4 kg (247 lb 12.8 oz)    BMI 30.16 kg/m2       Side Effects:  Sedation ? MENTAL STATUS EXAM:   Sensorium  oriented to time, place and person   Relations cooperative   Appearance:  age appropriate and casually dressed   Motor Behavior:  gait stable and within normal limits   Speech:  normal volume   Thought Process: goal directed   Thought Content free of delusions and free of hallucinations   Suicidal ideations no intention   Homicidal ideations no intention   Mood:  Anxious, depression   Affect:  anxious    Memory recent  adequate   Memory remote:  adequate   Concentration:  adequate   Abstraction:  abstract   Insight:  fair and good   Reliability good and fair   Judgment:  fair and good     MEDICAL DECISION MAKING:  Problems addressed today:    ICD-10-CM ICD-9-CM    1. Bipolar disorder, in partial remission, most recent episode hypomanic ([facility] [address] F31.71 296.80 ALPRAZolam (XANAX) 0.5 mg tablet      lithium carbonate 300 mg capsule      DULoxetine (CYMBALTA) 30 mg capsule   2. PTSD (post-traumatic stress disorder) F43.10 309.81 lithium carbonate 300 mg capsule      DULoxetine (CYMBALTA) 30 mg capsule   3. Complicated grief R47.09 309.0     Z63.4         Assessment:   Dasia Hussein is responding to treatment. Symptoms are improving overall. Pt reports he has had decreased severity of symptoms overall. Reviewed current medications and will continue current does except for seroquel. Pt will decrease to 300 mg and call if there is a drop in mood. Reviewed impact of concurrent use of narcotics and benzo's. Reviewed benzo safety and PRN guidelines. He states he has had to use ambien most nights but has decreased prn alprazolam use. Reviewed SE's of cognitive dulling and decreased energy/motivation. Pt discussed current stressors. Provided support and encouragement. Plan:   1. Current Outpatient Prescriptions   Medication Sig Dispense Refill    ALPRAZolam (XANAX) 0.5 mg tablet TAKE 1 TABLET BY MOUTH TWICE DAILY AS NEEDED FOR ANXIETY. DO NOT TAKE WITH OTHER BENZOS, NARCOTICS OR ALCOHOL 60 Tab 0    lithium carbonate 300 mg capsule TAKE TWO CAPSULES BY MOUTH DAILY AT BEDTIME 60 Cap 2    DULoxetine (CYMBALTA) 30 mg capsule Take 1 Cap by mouth daily. Take with 60 mg cap for total dose of 90 mg. 30 Cap 2    zolpidem (AMBIEN) 10 mg tablet TAKE ONE TABLET BY MOUTH AT BEDTIME AS NEEDED FOR SLEEP. DO NOT TAKE WITH PAIN MEDS OR BENZO MEDS OR ALCOHOL. 30 Tab 1    QUEtiapine SR (SEROQUEL XR) 400 mg sr tablet TAKE ONE TABLET BY MOUTH AT BEDTIME 30 Tab 2    DULoxetine (CYMBALTA) 60 mg capsule Take 1 Cap by mouth daily. 60 Cap 2    topiramate (TOPAMAX) 50 mg tablet Take  by mouth two (2) times a day.  fluticasone-salmeterol (ADVAIR DISKUS) 100-50 mcg/dose diskus inhaler Take 1 Puff by inhalation every twelve (12) hours.  loratadine (CLARITIN) 10 mg tablet Take 10 mg by mouth daily.  fentaNYL (DURAGESIC) 50 mcg/hr PATCH 1 Patch by TransDERmal route every fourty-eight (48) hours.  pregabalin (LYRICA) 150 mg capsule Take 150 mg by mouth three (3) times daily.  oxyCODONE IR (OXY-IR) 15 mg immediate release tablet Take 15 mg by mouth three (3) times daily as needed for Pain.       testosterone cypionate (DEPOTESTOTERONE CYPIONATE) 200 mg/mL injection by IntraMUSCular route every fourteen (14) days.  levothyroxine (SYNTHROID) 125 mcg tablet Take  by mouth Daily (before breakfast).  cholecalciferol, vitamin D3, (VITAMIN D3) 2,000 unit tab Take  by mouth.  metoprolol (LOPRESSOR) 25 mg tablet Take  by mouth daily.  rosuvastatin (CRESTOR) 40 mg tablet Take 40 mg by mouth nightly.  SUMAtriptan (IMITREX) 100 mg tablet Take 100 mg by mouth once as needed for Migraine.  polyethylene glycol (MIRALAX) 17 gram/dose powder Take 17 g by mouth daily as needed.  nitroglycerin (NITROSTAT) 0.4 mg SL tablet by SubLINGual route every five (5) minutes as needed for Chest Pain.            medication changes made today: dec seroquel 300mg daily for mood stabalization, cont lithium 600mg for mood stabilization, cont xanax 0.5mg daily PRN anxiety , ambien 5-10mg PRN sleep, cont cymbalta 90 mg for depression    2. Counseling and coordination of care including instructions for treatment, risks/benefits, risk factor reduction and patient/family education. He agrees with the plan. Patient instructed to call with any side effects, questions or issues. 3.  Follow-up Disposition:  Return in about 8 weeks (around 6/5/2018).      4. Ind/marriage counseling    4/10/2018  Mica Suárez NP

## 2018-04-10 NOTE — MR AVS SNAPSHOT
[address] [facility] 
[phone] Patient: Lucretia Cortez MRN: [id number] :1965 Visit Information Date & Time Provider Department Dept. Phone Encounter #  
 4/10/2018  1:30 PM Julissa Deluna NP [facility] [facility] [phone] [id number] Follow-up Instructions Return in about 8 weeks (around 2018). Upcoming Health Maintenance Date Due DTaP/Tdap/Td series (1 - Tdap) 1987 FOBT Q 1 YEAR AGE 50-75 2016 Influenza Age 5 to Adult 2017 MEDICARE YEARLY EXAM 4/10/2018 Allergies as of 4/10/2018  Review Complete On: 4/10/2018 By: Julissa Deluna NP Severity Noted Reaction Type Reactions Tetracycline High 2015    Swelling Current Immunizations  Never Reviewed No immunizations on file. Not reviewed this visit You Were Diagnosed With   
  
 Codes Comments Bipolar disorder, in partial remission, [facility]    -  Primary ICD-10-CM: F31.71 
ICD-9-CM: 296.80 PTSD (post-traumatic stress disorder)     ICD-10-CM: F43.10 ICD-9-CM: 309.81 Complicated grief     PIE-69-: F43.29, Z63.4 ICD-9-CM: 309.0 Vitals BP Pulse Height(growth percentile) Weight(growth percentile) BMI Smoking Status 143/74 81 6' 4\" (1.93 m) 247 lb 12.8 oz (112.4 kg) 30.16 kg/m2 Never Smoker BMI and BSA Data Body Mass Index Body Surface Area  
 30.16 kg/m 2 2.46 m 2 Preferred Pharmacy Pharmacy Name Phone [facility] [address] Kash Ferguson [phone] Your Updated Medication List  
  
   
This list is accurate as of 4/10/18  2:10 PM.  Always use your most recent med list.  
  
  
  
  
 ADVAIR DISKUS 100-50 mcg/dose diskus inhaler Generic drug:  fluticasone-salmeterol Take 1 Puff by inhalation every twelve (12) hours. ALPRAZolam 0.5 mg tablet Commonly known as:  XANAX  
TAKE 1 TABLET BY MOUTH TWICE DAILY AS NEEDED FOR ANXIETY. DO NOT TAKE WITH OTHER BENZOS, NARCOTICS OR ALCOHOL CLARITIN 10 mg tablet Generic drug:  loratadine Take 10 mg by mouth daily. CRESTOR 40 mg tablet Generic drug:  rosuvastatin Take 40 mg by mouth nightly. * DULoxetine 60 mg capsule Commonly known as:  CYMBALTA Take 1 Cap by mouth daily. * DULoxetine 30 mg capsule Commonly known as:  CYMBALTA Take 1 Cap by mouth daily. Take with 60 mg cap for total dose of 90 mg. DURAGESIC 50 mcg/hr PATCH Generic drug:  fentaNYL  
1 Patch by TransDERmal route every fourty-eight (48) hours. IMITREX 100 mg tablet Generic drug:  SUMAtriptan Take 100 mg by mouth once as needed for Migraine. lithium carbonate 300 mg capsule TAKE TWO CAPSULES BY MOUTH DAILY AT BEDTIME  
  
 LYRICA 150 mg capsule Generic drug:  pregabalin Take 150 mg by mouth three (3) times daily. metoprolol tartrate 25 mg tablet Commonly known as:  LOPRESSOR Take  by mouth daily. MIRALAX 17 gram/dose powder Generic drug:  polyethylene glycol Take 17 g by mouth daily as needed. nitroglycerin 0.4 mg SL tablet Commonly known as:  NITROSTAT  
by SubLINGual route every five (5) minutes as needed for Chest Pain. oxyCODONE IR 15 mg immediate release tablet Commonly known as:  OXY-IR Take 15 mg by mouth three (3) times daily as needed for Pain. QUEtiapine  mg sr tablet Commonly known as:  SEROquel XR  
TAKE ONE TABLET BY MOUTH AT BEDTIME  
  
 SYNTHROID 125 mcg tablet Generic drug:  levothyroxine Take  by mouth Daily (before breakfast). testosterone cypionate 200 mg/mL injection Commonly known as:  DEPOTESTOTERONE CYPIONATE  
by IntraMUSCular route every fourteen (14) days. TOPAMAX 50 mg tablet Generic drug:  topiramate Take  by mouth two (2) times a day. VITAMIN D3 2,000 unit Tab Generic drug:  cholecalciferol (vitamin D3) Take  by mouth.  
  
 zolpidem 10 mg tablet Commonly known as:  AMBIEN  
TAKE ONE TABLET BY MOUTH AT BEDTIME AS NEEDED FOR SLEEP. DO NOT TAKE WITH PAIN MEDS OR BENZO MEDS OR ALCOHOL. * Notice: This list has 2 medication(s) that are the same as other medications prescribed for you. Read the directions carefully, and ask your doctor or other care provider to review them with you. Prescriptions Printed Refills ALPRAZolam (XANAX) 0.5 mg tablet 0 Sig: TAKE 1 TABLET BY MOUTH TWICE DAILY AS NEEDED FOR ANXIETY. DO NOT TAKE WITH OTHER BENZOS, NARCOTICS OR ALCOHOL Class: Print Prescriptions Sent to Pharmacy Refills  
 lithium carbonate 300 mg capsule 2 Sig: TAKE TWO CAPSULES BY MOUTH DAILY AT BEDTIME Class: Normal  
 Pharmacy: [address] Ph #: [phone] DULoxetine (CYMBALTA) 30 mg capsule 2 Sig: Take 1 Cap by mouth daily. Take with 60 mg cap for total dose of 90 mg.  
 Class: Normal  
 Pharmacy: [address] Ph #: [phone] Route: Oral  
  
Follow-up Instructions Return in about 8 weeks (around 6/5/2018). Introducing [facility] SERVICES! Julio Cesar Monsalve introduces [email or web address] patient portal. Now you can access parts of your medical record, email your doctor's office, and request medication refills online. 1. In your internet browser, go to [email or web address] 2. Click on the First Time User? Click Here link in the Sign In box. You will see the New Member Sign Up page. 3. Enter your [email or web address] Access Code exactly as it appears below. You will not need to use this code after youve completed the sign-up process. If you do not sign up before the expiration date, you must request a new code. · [email or web address] Access Code: [email or web address] Expires: 7/9/2018  2:09 PM 
 
 4. Enter the last four digits of your Social Security Number (xxxx) and Date of Birth (mm/dd/yyyy) as indicated and click Submit. You will be taken to the next sign-up page. 5. Create a PolyInnovations ID. This will be your [employer] login ID and cannot be changed, so think of one that is secure and easy to remember. 6. Create a [employer]Innov[employer] password. You can change your password at any time. 7. Enter your Password Reset Question and Answer. This can be used at a later time if you forget your password. 8. Enter your e-mail address. You will receive e-mail notification when new information is available in [address] 9. Click Sign Up. You can now view and download portions of your medical record. 10. Click the Download Summary menu link to download a portable copy of your medical information. If you have questions, please visit the Frequently Asked Questions section of the [employer] website. Remember, [employer] is NOT to be used for urgent needs. For medical emergencies, dial 911. Now available from your iPhone and Android! Please provide this summary of care documentation to your next provider. If you have any questions after today's visit, please call [phone].

## 2018-04-21 DIAGNOSIS — F31.71 BIPOLAR DISORDER, IN PARTIAL REMISSION, MOST RECENT EPISODE HYPOMANIC (HCC): ICD-10-CM

## 2018-04-23 RX ORDER — ALPRAZOLAM 0.5 MG/1
TABLET ORAL
Qty: 60 TAB | Refills: 0 | OUTPATIENT
Start: 2018-04-23

## 2018-04-24 DIAGNOSIS — F43.10 PTSD (POST-TRAUMATIC STRESS DISORDER): ICD-10-CM

## 2018-04-24 DIAGNOSIS — F31.71 BIPOLAR DISORDER, IN PARTIAL REMISSION, MOST RECENT EPISODE HYPOMANIC (HCC): ICD-10-CM

## 2018-04-24 RX ORDER — QUETIAPINE 400 MG/1
TABLET, FILM COATED, EXTENDED RELEASE ORAL
Qty: 30 TAB | Refills: 1 | Status: SHIPPED | OUTPATIENT
Start: 2018-04-24 | End: 2018-07-09 | Stop reason: SDUPTHER

## 2018-04-26 DIAGNOSIS — F31.71 BIPOLAR DISORDER, IN PARTIAL REMISSION, MOST RECENT EPISODE HYPOMANIC (HCC): ICD-10-CM

## 2018-04-26 RX ORDER — ALPRAZOLAM 0.5 MG/1
TABLET ORAL
Qty: 60 TAB | Refills: 0 | OUTPATIENT
Start: 2018-04-26

## 2018-04-26 RX ORDER — ALPRAZOLAM 0.5 MG/1
TABLET ORAL
Qty: 60 TAB | Refills: 0 | OUTPATIENT
Start: 2018-04-26 | End: 2018-06-03 | Stop reason: SDUPTHER

## 2018-04-26 NOTE — TELEPHONE ENCOUNTER
Patient has misplaced script from 4/10.  obtained, not filled (LRD 2/19). Pharmacy does not have it on file.

## 2018-05-12 DIAGNOSIS — F31.71 BIPOLAR DISORDER, IN PARTIAL REMISSION, MOST RECENT EPISODE HYPOMANIC (HCC): ICD-10-CM

## 2018-05-12 DIAGNOSIS — F43.10 PTSD (POST-TRAUMATIC STRESS DISORDER): ICD-10-CM

## 2018-05-14 RX ORDER — ZOLPIDEM TARTRATE 10 MG/1
TABLET ORAL
Qty: 30 TAB | Refills: 1 | OUTPATIENT
Start: 2018-05-14 | End: 2018-07-10 | Stop reason: SDUPTHER

## 2018-06-03 DIAGNOSIS — F31.71 BIPOLAR DISORDER, IN PARTIAL REMISSION, MOST RECENT EPISODE HYPOMANIC (HCC): ICD-10-CM

## 2018-06-05 RX ORDER — ALPRAZOLAM 0.5 MG/1
TABLET ORAL
Qty: 60 TAB | Refills: 0 | Status: SHIPPED | OUTPATIENT
Start: 2018-06-05 | End: 2018-07-10 | Stop reason: SDUPTHER

## 2018-07-05 DIAGNOSIS — F31.71 BIPOLAR DISORDER, IN PARTIAL REMISSION, MOST RECENT EPISODE HYPOMANIC (HCC): ICD-10-CM

## 2018-07-05 DIAGNOSIS — F43.10 PTSD (POST-TRAUMATIC STRESS DISORDER): ICD-10-CM

## 2018-07-05 RX ORDER — DULOXETIN HYDROCHLORIDE 30 MG/1
CAPSULE, DELAYED RELEASE ORAL
Qty: 30 CAP | Refills: 0 | Status: SHIPPED | OUTPATIENT
Start: 2018-07-05 | End: 2018-07-10 | Stop reason: SDUPTHER

## 2018-07-06 DIAGNOSIS — F31.71 BIPOLAR DISORDER, IN PARTIAL REMISSION, MOST RECENT EPISODE HYPOMANIC (HCC): ICD-10-CM

## 2018-07-06 RX ORDER — ALPRAZOLAM 0.5 MG/1
TABLET ORAL
Qty: 60 TAB | Refills: 0 | OUTPATIENT
Start: 2018-07-06

## 2018-07-09 DIAGNOSIS — F31.71 BIPOLAR DISORDER, IN PARTIAL REMISSION, MOST RECENT EPISODE HYPOMANIC (HCC): ICD-10-CM

## 2018-07-09 DIAGNOSIS — F43.10 PTSD (POST-TRAUMATIC STRESS DISORDER): ICD-10-CM

## 2018-07-09 RX ORDER — QUETIAPINE 400 MG/1
TABLET, FILM COATED, EXTENDED RELEASE ORAL
Qty: 30 TAB | Refills: 0 | Status: SHIPPED | OUTPATIENT
Start: 2018-07-09 | End: 2018-08-06 | Stop reason: SDUPTHER

## 2018-07-09 NOTE — TELEPHONE ENCOUNTER
Pt called and said pharmacy told him he needed to schedule fu for refills. He said he didn't know he missed his last appt per death in the family and son having seizures lately, a lot going on. Rain Laurent he is going to the beach next week and needs refills on fill for all meds bc he will be out of them this week or in 2 weeks when he gets back. He is aware seroquel was sent over but is asking for RF on xanax, both Cymbaltas, lithium, and the zolpidem.

## 2018-07-10 RX ORDER — LITHIUM CARBONATE 300 MG/1
CAPSULE ORAL
Qty: 60 CAP | Refills: 0 | Status: SHIPPED | OUTPATIENT
Start: 2018-07-10 | End: 2018-08-15 | Stop reason: SDUPTHER

## 2018-07-10 RX ORDER — DULOXETIN HYDROCHLORIDE 30 MG/1
CAPSULE, DELAYED RELEASE ORAL
Qty: 30 CAP | Refills: 0 | Status: SHIPPED | OUTPATIENT
Start: 2018-07-10 | End: 2018-08-15 | Stop reason: SDUPTHER

## 2018-07-10 RX ORDER — DULOXETIN HYDROCHLORIDE 60 MG/1
60 CAPSULE, DELAYED RELEASE ORAL DAILY
Qty: 60 CAP | Refills: 0 | Status: SHIPPED | OUTPATIENT
Start: 2018-07-10 | End: 2018-08-15 | Stop reason: SDUPTHER

## 2018-07-10 RX ORDER — ZOLPIDEM TARTRATE 10 MG/1
TABLET ORAL
Qty: 30 TAB | Refills: 0 | OUTPATIENT
Start: 2018-07-10 | End: 2018-08-15 | Stop reason: SDUPTHER

## 2018-07-10 RX ORDER — ALPRAZOLAM 0.5 MG/1
TABLET ORAL
Qty: 60 TAB | Refills: 0 | OUTPATIENT
Start: 2018-07-10 | End: 2018-08-15 | Stop reason: SDUPTHER

## 2018-07-10 NOTE — TELEPHONE ENCOUNTER
LRD zolpidem 6/12, 5/16, 4/11, 3/14 (Due July 13 if taking nightly)    LRD alprazolam 6/5, 426, 2/19    Follow up scheduled 8/15

## 2018-08-06 DIAGNOSIS — F43.10 PTSD (POST-TRAUMATIC STRESS DISORDER): ICD-10-CM

## 2018-08-06 DIAGNOSIS — F31.71 BIPOLAR DISORDER, IN PARTIAL REMISSION, MOST RECENT EPISODE HYPOMANIC (HCC): ICD-10-CM

## 2018-08-06 RX ORDER — QUETIAPINE 400 MG/1
TABLET, FILM COATED, EXTENDED RELEASE ORAL
Qty: 30 TAB | Refills: 0 | Status: SHIPPED | OUTPATIENT
Start: 2018-08-06 | End: 2018-08-15 | Stop reason: SDUPTHER

## 2018-08-15 ENCOUNTER — OFFICE VISIT (OUTPATIENT)
Dept: BEHAVIORAL/MENTAL HEALTH CLINIC | Age: 53
End: 2018-08-15

## 2018-08-15 VITALS
HEART RATE: 74 BPM | SYSTOLIC BLOOD PRESSURE: 122 MMHG | BODY MASS INDEX: 29.47 KG/M2 | WEIGHT: 242 LBS | DIASTOLIC BLOOD PRESSURE: 78 MMHG | HEIGHT: 76 IN

## 2018-08-15 DIAGNOSIS — F43.10 PTSD (POST-TRAUMATIC STRESS DISORDER): ICD-10-CM

## 2018-08-15 DIAGNOSIS — F31.71 BIPOLAR DISORDER, IN PARTIAL REMISSION, MOST RECENT EPISODE HYPOMANIC (HCC): Primary | ICD-10-CM

## 2018-08-15 RX ORDER — DULOXETIN HYDROCHLORIDE 60 MG/1
60 CAPSULE, DELAYED RELEASE ORAL DAILY
Qty: 60 CAP | Refills: 2 | Status: SHIPPED | OUTPATIENT
Start: 2018-08-15 | End: 2018-11-14 | Stop reason: SDUPTHER

## 2018-08-15 RX ORDER — LITHIUM CARBONATE 300 MG/1
CAPSULE ORAL
Qty: 60 CAP | Refills: 2 | Status: SHIPPED | OUTPATIENT
Start: 2018-08-15 | End: 2018-11-09 | Stop reason: SDUPTHER

## 2018-08-15 RX ORDER — ZOLPIDEM TARTRATE 10 MG/1
TABLET ORAL
Qty: 30 TAB | Refills: 1 | Status: SHIPPED | OUTPATIENT
Start: 2018-08-15 | End: 2018-10-11 | Stop reason: SDUPTHER

## 2018-08-15 RX ORDER — QUETIAPINE 400 MG/1
TABLET, FILM COATED, EXTENDED RELEASE ORAL
Qty: 30 TAB | Refills: 2 | Status: SHIPPED | OUTPATIENT
Start: 2018-08-15 | End: 2018-11-14 | Stop reason: SDUPTHER

## 2018-08-15 RX ORDER — ALPRAZOLAM 0.5 MG/1
TABLET ORAL
Qty: 60 TAB | Refills: 1 | Status: SHIPPED | OUTPATIENT
Start: 2018-08-15 | End: 2018-11-07 | Stop reason: SDUPTHER

## 2018-08-15 RX ORDER — DULOXETIN HYDROCHLORIDE 30 MG/1
CAPSULE, DELAYED RELEASE ORAL
Qty: 30 CAP | Refills: 2 | Status: SHIPPED | OUTPATIENT
Start: 2018-08-15 | End: 2018-11-14 | Stop reason: SDUPTHER

## 2018-08-15 NOTE — MR AVS SNAPSHOT
[address] [facility] 
[phone] Patient: Bharti Grant MRN: [id number] :1965 Visit Information Date & Time Provider Department Dept. Phone Encounter #  
 8/15/2018  2:30 PM ADONAY Barros [address] [phone] [id number] Follow-up Instructions Return in about 3 months (around 11/15/2018). Follow-up and Disposition History Your Appointments 2018  2:30 PM  
ESTABLISHED PATIENT with ADONAY Barros 178 [facility]) Appt Note: 3 month f/u  
 [address] [facility] [address] [address] [address] [place] [address] [address] [facility] Upcoming Health Maintenance Date Due DTaP/Tdap/Td series (1 - Tdap) 1987 FOBT Q 1 YEAR AGE 50-75 2016 Influenza Age 5 to Adult 2018 Allergies as of 8/15/2018  Review Complete On: 8/15/2018 By: Carroll Stewart NP Severity Noted Reaction Type Reactions Tetracycline High 2015    Swelling Current Immunizations  Never Reviewed No immunizations on file. Not reviewed this visit You Were Diagnosed With   
  
 Codes Comments Bipolar disorder, in partial remission, most recent episode [facility]oman[facility]    -  Primary ICD-10-CM: F31.71 
ICD-9-CM: 296.80 PTSD (post-traumatic stress disorder)     ICD-10-CM: F43.10 ICD-9-CM: 309.81 Vitals BP Pulse Height(growth percentile) Weight(growth percentile) BMI Smoking Status 122/78 74 6' 4\" (1.93 m) 242 lb (109.8 kg) 29.46 kg/m2 Never Smoker BMI and BSA Data Body Mass Index Body Surface Area  
 29.46 kg/m 2 2.43 m 2 Preferred Pharmacy Pharmacy Name Phone [address] [facility], [address] Jose Chandler [phone] Your Updated Medication List  
  
   
 This list is accurate as of 8/15/18  3:17 PM.  Always use your most recent med list.  
  
  
  
  
 ADVAIR DISKUS 100-50 mcg/dose diskus inhaler Generic drug:  fluticasone-salmeterol Take 1 Puff by inhalation every twelve (12) hours. ALPRAZolam 0.5 mg tablet Commonly known as:  XANAX  
TAKE ONE TABLET BY MOUTH TWICE DAILY AS NEEDED CLARITIN 10 mg tablet Generic drug:  loratadine Take 10 mg by mouth daily. CRESTOR 40 mg tablet Generic drug:  rosuvastatin Take 40 mg by mouth nightly. * DULoxetine 30 mg capsule Commonly known as:  CYMBALTA TAKE 1 CAPSULE BY MOUTH ONCE DAILY (TAKE ALONG WITH 60 MG.) * DULoxetine 60 mg capsule Commonly known as:  CYMBALTA Take 1 Cap by mouth daily. DURAGESIC 50 mcg/hr PATCH Generic drug:  fentaNYL  
1 Patch by TransDERmal route every fourty-eight (48) hours. IMITREX 100 mg tablet Generic drug:  SUMAtriptan Take 100 mg by mouth once as needed for Migraine. lithium carbonate 300 mg capsule TAKE TWO CAPSULES BY MOUTH DAILY AT BEDTIME  
  
 LYRICA 150 mg capsule Generic drug:  pregabalin Take 150 mg by mouth three (3) times daily. metoprolol tartrate 25 mg tablet Commonly known as:  LOPRESSOR Take  by mouth daily. MIRALAX 17 gram/dose powder Generic drug:  polyethylene glycol Take 17 g by mouth daily as needed. nitroglycerin 0.4 mg SL tablet Commonly known as:  NITROSTAT  
by SubLINGual route every five (5) minutes as needed for Chest Pain. oxyCODONE IR 15 mg immediate release tablet Commonly known as:  OXY-IR Take 15 mg by mouth three (3) times daily as needed for Pain. QUEtiapine  mg sr tablet Commonly known as:  SEROquel XR  
TAKE ONE TABLET BY MOUTH AT BEDTIME  
  
 SYNTHROID 125 mcg tablet Generic drug:  levothyroxine Take  by mouth Daily (before breakfast). testosterone cypionate 200 mg/mL injection Commonly known as:  DEPOTESTOTERONE CYPIONATE  
by IntraMUSCular route every fourteen (14) days. TOPAMAX 50 mg tablet Generic drug:  topiramate Take  by mouth two (2) times a day. VITAMIN D3 2,000 unit Tab Generic drug:  cholecalciferol (vitamin D3) Take  by mouth.  
  
 zolpidem 10 mg tablet Commonly known as:  AMBIEN  
TAKE ONE TABLET BY MOUTH AT BEDTIME AS NEEDED FOR SLEEP, DO NOT TAKE WITH PAIN MEDS, BENZOS OR ALCOHOL. * Notice: This list has 2 medication(s) that are the same as other medications prescribed for you. Read the directions carefully, and ask your doctor or other care provider to review them with you. Prescriptions Printed Refills  
 zolpidem (AMBIEN) 10 mg tablet 1 Sig: TAKE ONE TABLET BY MOUTH AT BEDTIME AS NEEDED FOR SLEEP, DO NOT TAKE WITH PAIN MEDS, BENZOS OR ALCOHOL. Class: Print ALPRAZolam (XANAX) 0.5 mg tablet 1 Sig: TAKE ONE TABLET BY MOUTH TWICE DAILY AS NEEDED Class: Print Prescriptions Sent to Pharmacy Refills QUEtiapine SR (SEROQUEL XR) 400 mg sr tablet 2 Sig: TAKE ONE TABLET BY MOUTH AT BEDTIME Class: Normal  
 Pharmacy: [address] [place] [address] Ph #: [phone] DULoxetine (CYMBALTA) 30 mg capsule 2 Sig: TAKE 1 CAPSULE BY MOUTH ONCE DAILY (TAKE ALONG WITH 60 MG.) Class: Normal  
 Pharmacy: [address] [place] [address] Ph #: [phone] DULoxetine (CYMBALTA) 60 mg capsule 2 Sig: Take 1 Cap by mouth daily. Class: Normal  
 Pharmacy: [address] [place] [address] Ph #: [phone] Route: Oral  
 lithium carbonate 300 mg capsule 2 Sig: TAKE TWO CAPSULES BY MOUTH DAILY AT BEDTIME Class: Normal  
 Pharmacy: [address] Ph #: [phone] We Performed the Following CBC W/O DIFF [57263 CPT(R)] LITHIUM [id number] CPT(R)] METABOLIC PANEL, COMPREHENSIVE [61609 CPT(R)] Follow-up Instructions Return in about 3 months (around 11/15/2018). Introducing [address]! [facility] introduces [employer] patient portal. Now you can access parts of your medical record, email your doctor's office, and request medication refills online. 1. In your internet browser, go to [email or web address] 2. Click on the First Time User? Click Here link in the Sign In box. You will see the New Member Sign Up page. 3. Enter your [employer] Access Code exactly as it appears below. You will not need to use this code after youve completed the sign-up process. If you do not sign up before the expiration date, you must request a new code. · [employer] Access Code: [email or web address] Expires: 11/13/2018  3:17 PM 
 
4. Enter the last four digits of your Social Security Number (xxxx) and Date of Birth (mm/dd/yyyy) as indicated and click Submit. You will be taken to the next sign-up page. 5. Create a Pro[employer]chips ID. This will be your [employer] login ID and cannot be changed, so think of one that is secure and easy to remember. 6. Create a Pro[employer] password. You can change your password at any time. 7. Enter your Password Reset Question and Answer. This can be used at a later time if you forget your password. 8. Enter your e-mail address. You will receive e-mail notification when new information is available in [address] 9. Click Sign Up. You can now view and download portions of your medical record. 10. Click the Download Summary menu link to download a portable copy of your medical information. If you have questions, please visit the Frequently Asked Questions section of the [employer] website. Remember, Pro[employer] is NOT to be used for urgent needs. For medical emergencies, dial 911. Now available from your iPhone and Android! Please provide this summary of care documentation to your next provider. If you have any questions after today's visit, please call [phone].

## 2018-08-15 NOTE — PROGRESS NOTES
CHIEF COMPLAINT:  Rogelio Lee is a 47 y.o. male and was seen today for follow-up of psychiatric condition and psychotropic medication management. HPI:    Fco Mendoza reports the following psychiatric symptoms:  depression and anxiety. The symptoms have been present for months and are of moderate/high severity. The anxiety symptoms occur more frequently and more severely overall. He states depression is moderate severity overall. He reports an increase in psychosocial stressors. Overall he reports benefit from current medications. Pt here today to review current treatment plan. FAMILY/SOCIAL HX: daughter's   in [place] [id number], son has been hospitalized numerous times    REVIEW OF SYSTEMS:  Psychiatric:  depression, anxiety  Appetite:good, has lost some weight   Sleep: good with use of ambien, has been 1/2 to 1 tab every night, reports he does not use pain pills at night  Neuro: chronic pain, \"tremors\"    Visit Vitals    /78    Pulse 74    Ht 6' 4\" (1.93 m)    Wt 109.8 kg (242 lb)    BMI 29.46 kg/m2       Side Effects:  none    MENTAL STATUS EXAM:   Sensorium  oriented to time, place and person   Relations cooperative   Appearance:  age appropriate and casually dressed   Motor Behavior:  gait stable and within normal limits   Speech:  normal volume   Thought Process: goal directed   Thought Content free of delusions and free of hallucinations   Suicidal ideations no intention   Homicidal ideations no intention   Mood:  anxious and depressed   Affect:  anxious and depressed   Memory recent  adequate   Memory remote:  adequate   Concentration:  adequate   Abstraction:  abstract   Insight:  fair   Reliability fair   Judgment:  fair     MEDICAL DECISION MAKING:  Problems addressed today:    ICD-10-CM ICD-9-CM    1. Bipolar disorder, in partial remission, most recent episode [facility] F31.71 296.80    2.  PTSD (post-traumatic stress disorder) F43.10 309.81        Assessment:   Fco Mendoza is responding to treatment overall. Anxiety and depression symptoms are exacerbated and moderate to high severity. Pt reports he has been dealing with significant stressors. Reviewed current treatment plan and he will continue with current meds and dosages at this time. Discussed possibility of increasing seroquel and/or lithium for better symptoms management vs ongoing daily use of ambien and alprazolam. Pt open to idea but reports in past he was not able to wake up with 500 mg dose of seroquel. Reviewed risk on long term benzo use as well as use with narcotics. Reviewed benzo safety and prn guidelines. Will check lithium level and pt will continue to reduce ambien and alprazolam use. Pt aware he cant stop use suddenly and knows he has to wean off slowly. Pt discussed current stressors. Provided support and reinforced importance of ind and family therapy. Plan:   1. Current Outpatient Prescriptions   Medication Sig Dispense Refill    QUEtiapine SR (SEROQUEL XR) 400 mg sr tablet TAKE ONE TABLET BY MOUTH AT BEDTIME 30 Tab 0    zolpidem (AMBIEN) 10 mg tablet TAKE ONE TABLET BY MOUTH AT BEDTIME AS NEEDED FOR SLEEP, DO NOT TAKE WITH PAIN MEDS, BENZOS OR ALCOHOL. 30 Tab 0    ALPRAZolam (XANAX) 0.5 mg tablet TAKE ONE TABLET BY MOUTH TWICE DAILY AS NEEDED 60 Tab 0    DULoxetine (CYMBALTA) 30 mg capsule TAKE 1 CAPSULE BY MOUTH ONCE DAILY (TAKE ALONG WITH 60 MG.) 30 Cap 0    DULoxetine (CYMBALTA) 60 mg capsule Take 1 Cap by mouth daily. 60 Cap 0    lithium carbonate 300 mg capsule TAKE TWO CAPSULES BY MOUTH DAILY AT BEDTIME 60 Cap 0    topiramate (TOPAMAX) 50 mg tablet Take  by mouth two (2) times a day.  fluticasone-salmeterol (ADVAIR DISKUS) 100-50 mcg/dose diskus inhaler Take 1 Puff by inhalation every twelve (12) hours.  loratadine (CLARITIN) 10 mg tablet Take 10 mg by mouth daily.  fentaNYL (DURAGESIC) 50 mcg/hr PATCH 1 Patch by TransDERmal route every fourty-eight (48) hours.       pregabalin (LYRICA) 150 mg capsule Take 150 mg by mouth three (3) times daily.  oxyCODONE IR (OXY-IR) 15 mg immediate release tablet Take 15 mg by mouth three (3) times daily as needed for Pain.  testosterone cypionate (DEPOTESTOTERONE CYPIONATE) 200 mg/mL injection by IntraMUSCular route every fourteen (14) days.  levothyroxine (SYNTHROID) 125 mcg tablet Take  by mouth Daily (before breakfast).  cholecalciferol, vitamin D3, (VITAMIN D3) 2,000 unit tab Take  by mouth.  metoprolol (LOPRESSOR) 25 mg tablet Take  by mouth daily.  rosuvastatin (CRESTOR) 40 mg tablet Take 40 mg by mouth nightly.  SUMAtriptan (IMITREX) 100 mg tablet Take 100 mg by mouth once as needed for Migraine.  polyethylene glycol (MIRALAX) 17 gram/dose powder Take 17 g by mouth daily as needed.  nitroglycerin (NITROSTAT) 0.4 mg SL tablet by SubLINGual route every five (5) minutes as needed for Chest Pain.            medication changes made today: seroquel 400mg daily for mood stabalization, cont lithium 600mg for mood stabilization, cont xanax 0.5mg daily PRN anxiety , ambien 5-10mg PRN sleep, cont cymbalta 90 mg for depression    2. Counseling and coordination of care including instructions for treatment, risks/benefits, risk factor reduction and patient/family education. He agrees with the plan. Patient instructed to call with any side effects, questions or issues. 3.  Follow-up Disposition: Not on File     4. Lithium level and CBC, CMP    5.  Ind therapy and couples therapy    8/15/2018  Evelia Diez NP

## 2018-08-27 DIAGNOSIS — F31.71 BIPOLAR DISORDER, IN PARTIAL REMISSION, MOST RECENT EPISODE HYPOMANIC (HCC): ICD-10-CM

## 2018-08-27 DIAGNOSIS — F43.10 PTSD (POST-TRAUMATIC STRESS DISORDER): ICD-10-CM

## 2018-08-27 RX ORDER — DULOXETIN HYDROCHLORIDE 30 MG/1
CAPSULE, DELAYED RELEASE ORAL
Qty: 30 CAP | Refills: 0 | OUTPATIENT
Start: 2018-08-27

## 2018-09-05 LAB
ALBUMIN SERPL-MCNC: 4.2 G/DL (ref 3.5–5.5)
ALBUMIN/GLOB SERPL: 1.8 {RATIO} (ref 1.2–2.2)
ALP SERPL-CCNC: 82 IU/L (ref 39–117)
ALT SERPL-CCNC: 22 IU/L (ref 0–44)
AST SERPL-CCNC: 17 IU/L (ref 0–40)
BILIRUB SERPL-MCNC: 0.5 MG/DL (ref 0–1.2)
BUN SERPL-MCNC: 18 MG/DL (ref 6–24)
BUN/CREAT SERPL: 17 (ref 9–20)
CALCIUM SERPL-MCNC: 9.5 MG/DL (ref 8.7–10.2)
CHLORIDE SERPL-SCNC: 107 MMOL/L (ref 96–106)
CO2 SERPL-SCNC: 21 MMOL/L (ref 20–29)
CREAT SERPL-MCNC: 1.08 MG/DL (ref 0.76–1.27)
ERYTHROCYTE [DISTWIDTH] IN BLOOD BY AUTOMATED COUNT: 14.8 % (ref 12.3–15.4)
GLOBULIN SER CALC-MCNC: 2.4 G/DL (ref 1.5–4.5)
GLUCOSE SERPL-MCNC: 116 MG/DL (ref 65–99)
HCT VFR BLD AUTO: 44.3 % (ref 37.5–51)
HGB BLD-MCNC: 14.6 G/DL (ref 13–17.7)
LITHIUM SERPL-SCNC: 0.6 MMOL/L (ref 0.6–1.2)
MCH RBC QN AUTO: 31.5 PG (ref 26.6–33)
MCHC RBC AUTO-ENTMCNC: 33 G/DL (ref 31.5–35.7)
MCV RBC AUTO: 96 FL (ref 79–97)
PLATELET # BLD AUTO: 210 X10E3/UL (ref 150–379)
POTASSIUM SERPL-SCNC: 4.1 MMOL/L (ref 3.5–5.2)
PROT SERPL-MCNC: 6.6 G/DL (ref 6–8.5)
RBC # BLD AUTO: 4.64 X10E6/UL (ref 4.14–5.8)
SODIUM SERPL-SCNC: 142 MMOL/L (ref 134–144)
WBC # BLD AUTO: 6.9 X10E3/UL (ref 3.4–10.8)

## 2018-10-10 DIAGNOSIS — F43.10 PTSD (POST-TRAUMATIC STRESS DISORDER): ICD-10-CM

## 2018-10-10 DIAGNOSIS — F31.71 BIPOLAR DISORDER, IN PARTIAL REMISSION, MOST RECENT EPISODE HYPOMANIC (HCC): ICD-10-CM

## 2018-10-10 RX ORDER — ZOLPIDEM TARTRATE 10 MG/1
TABLET ORAL
Qty: 30 TAB | Refills: 0 | Status: CANCELLED | OUTPATIENT
Start: 2018-10-14

## 2018-10-11 DIAGNOSIS — F43.10 PTSD (POST-TRAUMATIC STRESS DISORDER): ICD-10-CM

## 2018-10-11 DIAGNOSIS — F31.71 BIPOLAR DISORDER, IN PARTIAL REMISSION, MOST RECENT EPISODE HYPOMANIC (HCC): ICD-10-CM

## 2018-10-11 RX ORDER — ZOLPIDEM TARTRATE 10 MG/1
TABLET ORAL
Qty: 30 TAB | Refills: 1 | Status: SHIPPED | OUTPATIENT
Start: 2018-10-11 | End: 2018-11-14 | Stop reason: SDUPTHER

## 2018-11-07 DIAGNOSIS — F31.71 BIPOLAR DISORDER, IN PARTIAL REMISSION, MOST RECENT EPISODE HYPOMANIC (HCC): ICD-10-CM

## 2018-11-08 RX ORDER — ALPRAZOLAM 0.5 MG/1
TABLET ORAL
Qty: 60 TAB | Refills: 0 | Status: SHIPPED | OUTPATIENT
Start: 2018-11-08 | End: 2018-11-14 | Stop reason: SDUPTHER

## 2018-11-09 DIAGNOSIS — F31.71 BIPOLAR DISORDER, IN PARTIAL REMISSION, MOST RECENT EPISODE HYPOMANIC (HCC): ICD-10-CM

## 2018-11-09 DIAGNOSIS — F43.10 PTSD (POST-TRAUMATIC STRESS DISORDER): ICD-10-CM

## 2018-11-09 RX ORDER — LITHIUM CARBONATE 300 MG/1
CAPSULE ORAL
Qty: 60 CAP | Refills: 0 | Status: SHIPPED | OUTPATIENT
Start: 2018-11-09 | End: 2018-11-14 | Stop reason: SDUPTHER

## 2018-11-14 ENCOUNTER — OFFICE VISIT (OUTPATIENT)
Dept: BEHAVIORAL/MENTAL HEALTH CLINIC | Age: 53
End: 2018-11-14

## 2018-11-14 VITALS
BODY MASS INDEX: 29.22 KG/M2 | SYSTOLIC BLOOD PRESSURE: 168 MMHG | HEIGHT: 76 IN | DIASTOLIC BLOOD PRESSURE: 80 MMHG | WEIGHT: 240 LBS | HEART RATE: 83 BPM

## 2018-11-14 DIAGNOSIS — F31.71 BIPOLAR DISORDER, IN PARTIAL REMISSION, MOST RECENT EPISODE HYPOMANIC (HCC): Primary | ICD-10-CM

## 2018-11-14 DIAGNOSIS — F43.10 PTSD (POST-TRAUMATIC STRESS DISORDER): ICD-10-CM

## 2018-11-14 DIAGNOSIS — F43.21 COMPLICATED GRIEF: ICD-10-CM

## 2018-11-14 RX ORDER — QUETIAPINE 200 MG/1
200 TABLET, FILM COATED, EXTENDED RELEASE ORAL
Qty: 30 TAB | Refills: 2 | Status: SHIPPED | OUTPATIENT
Start: 2018-11-14 | End: 2019-02-09 | Stop reason: SDUPTHER

## 2018-11-14 RX ORDER — ZOLPIDEM TARTRATE 10 MG/1
TABLET ORAL
Qty: 30 TAB | Refills: 1 | Status: SHIPPED | OUTPATIENT
Start: 2018-12-14 | End: 2019-02-25 | Stop reason: SDUPTHER

## 2018-11-14 RX ORDER — DULOXETIN HYDROCHLORIDE 30 MG/1
CAPSULE, DELAYED RELEASE ORAL
Qty: 30 CAP | Refills: 2 | Status: SHIPPED | OUTPATIENT
Start: 2018-11-14 | End: 2019-02-14 | Stop reason: SDUPTHER

## 2018-11-14 RX ORDER — LITHIUM CARBONATE 300 MG/1
CAPSULE ORAL
Qty: 60 CAP | Refills: 1 | Status: SHIPPED | OUTPATIENT
Start: 2018-11-14 | End: 2019-02-04 | Stop reason: SDUPTHER

## 2018-11-14 RX ORDER — QUETIAPINE 400 MG/1
TABLET, FILM COATED, EXTENDED RELEASE ORAL
Qty: 30 TAB | Refills: 2 | Status: SHIPPED | OUTPATIENT
Start: 2018-11-14 | End: 2019-02-14 | Stop reason: SDUPTHER

## 2018-11-14 RX ORDER — ALPRAZOLAM 0.5 MG/1
TABLET ORAL
Qty: 60 TAB | Refills: 1 | Status: SHIPPED | OUTPATIENT
Start: 2018-12-14 | End: 2019-02-19 | Stop reason: SDUPTHER

## 2018-11-14 RX ORDER — DULOXETIN HYDROCHLORIDE 60 MG/1
60 CAPSULE, DELAYED RELEASE ORAL DAILY
Qty: 60 CAP | Refills: 2 | Status: SHIPPED | OUTPATIENT
Start: 2018-11-14 | End: 2019-06-19 | Stop reason: DRUGHIGH

## 2018-11-14 NOTE — PROGRESS NOTES
CHIEF COMPLAINT:  Johana Gorman is a 47 y.o. male and was seen today for follow-up of psychiatric condition and psychotropic medication management. HPI:    Reena Beaulieu reports the following psychiatric symptoms: hypomania/anette, agitation, depression and anxiety. The symptoms have been present for months and are of moderate/high severity. The symptoms occur more frequently and more severely. Pt reports he has been dealing with significant stressors. Overall medications have been effective. He is here today to review current treatment plan. FAMILY/SOCIAL HX: son was hospitalized x6 weeks    REVIEW OF SYSTEMS:  Psychiatric:  depression, anxiety, hypomania/anette, agitation  Appetite:decreased   Sleep: improved now that he is home  Neuro: chronic pain    Visit Vitals  /80   Pulse 83   Ht 6' 4\" (1.93 m)   Wt 108.9 kg (240 lb)   BMI 29.21 kg/m²       Side Effects:  none    MENTAL STATUS EXAM:   Sensorium  oriented to time, place and person   Relations cooperative   Appearance:  age appropriate and casually dressed   Motor Behavior:  gait stable and within normal limits   Speech:  normal volume   Thought Process: goal directed   Thought Content free of delusions and free of hallucinations   Suicidal ideations no intention   Homicidal ideations no intention   Mood:  anxious and depressed   Affect:  anxious and depressed   Memory recent  adequate   Memory remote:  adequate   Concentration:  adequate   Abstraction:  abstract   Insight:  fair   Reliability fair   Judgment:  fair     MEDICAL DECISION MAKING:  Problems addressed today:    ICD-10-CM ICD-9-CM    1. Bipolar disorder, in partial remission, most recent episode hypomanic ([facility]) F31.71 296.80 DULoxetine (CYMBALTA) 60 mg capsule      DULoxetine (CYMBALTA) 30 mg capsule      QUEtiapine SR (SEROQUEL XR) 400 mg sr tablet      ALPRAZolam (XANAX) 0.5 mg tablet      zolpidem (AMBIEN) 10 mg tablet      lithium carbonate 300 mg capsule   2.  PTSD (post-traumatic stress disorder) F43.10 309.81 DULoxetine (CYMBALTA) 60 mg capsule      DULoxetine (CYMBALTA) 30 mg capsule      QUEtiapine SR (SEROQUEL XR) 400 mg sr tablet      zolpidem (AMBIEN) 10 mg tablet      lithium carbonate 300 mg capsule   3. Complicated grief U03.48 309.0     Z63.4         Assessment:   Selena Ontiveros is responding to treatment somewhat. He reports chronic stressors and now he is experiencing breakthrough depression. Reviewed current medications and agreed to increase seroquel to 800 mg. Last lithium level was 0.6 on 9/5/18 and in therapeutic range. CBC and CMP wnl except for elevated glucose. Reviewed goal of mood stabilization and depression management. Discussed risk of mood de-stabilization with decreased sleep and increased stressors. Reviewed possibility that with higher dose of seroquel he may not need ambien. Reviewed benzo safety, prn guidelines and long term risks for cognitive deficits. Pt verbalized awareness and states risk outweighs benefit as he has has a hard time with anxiety. Pt has not been able to get in to see his therapist and reinforced importance for anxiety management, provided support and encouragement. Plan:   1. Current Outpatient Medications   Medication Sig Dispense Refill    DULoxetine (CYMBALTA) 60 mg capsule Take 1 Cap by mouth daily. 60 Cap 2    DULoxetine (CYMBALTA) 30 mg capsule TAKE 1 CAPSULE BY MOUTH ONCE DAILY (TAKE ALONG WITH 60 MG.) 30 Cap 2    QUEtiapine SR (SEROQUEL XR) 400 mg sr tablet TAKE ONE TABLET BY MOUTH AT BEDTIME 30 Tab 2    QUEtiapine SR (SEROQUEL XR) 200 mg XR tablet Take 1 Tab by mouth nightly. Take with 400 mg tab for total dose of 600 mg. 30 Tab 2    [START ON 12/14/2018] ALPRAZolam (XANAX) 0.5 mg tablet TAKE ONE TABLET BY MOUTH TWICE A DAY AS NEEDED 60 Tab 1    [START ON 12/14/2018] zolpidem (AMBIEN) 10 mg tablet TAKE ONE TABLET BY MOUTH AT BEDTIME AS NEEDED FOR SLEEP, DO NOT TAKE WITH PAIN MEDS, BENZOS OR ALCOHOL.  30 Tab 1    lithium carbonate 300 mg capsule TAKE TWO CAPSULES BY MOUTH EVERY NIGHT AT BEDTIME 60 Cap 1    topiramate (TOPAMAX) 50 mg tablet Take  by mouth two (2) times a day.  fluticasone-salmeterol (ADVAIR DISKUS) 100-50 mcg/dose diskus inhaler Take 1 Puff by inhalation every twelve (12) hours.  loratadine (CLARITIN) 10 mg tablet Take 10 mg by mouth daily.  fentaNYL (DURAGESIC) 50 mcg/hr PATCH 1 Patch by TransDERmal route every fourty-eight (48) hours.  pregabalin (LYRICA) 150 mg capsule Take 150 mg by mouth three (3) times daily.  oxyCODONE IR (OXY-IR) 15 mg immediate release tablet Take 15 mg by mouth three (3) times daily as needed for Pain.  testosterone cypionate (DEPOTESTOTERONE CYPIONATE) 200 mg/mL injection by IntraMUSCular route every fourteen (14) days.  levothyroxine (SYNTHROID) 125 mcg tablet Take  by mouth Daily (before breakfast).  cholecalciferol, vitamin D3, (VITAMIN D3) 2,000 unit tab Take  by mouth.  metoprolol (LOPRESSOR) 25 mg tablet Take  by mouth daily.  rosuvastatin (CRESTOR) 40 mg tablet Take 40 mg by mouth nightly.  SUMAtriptan (IMITREX) 100 mg tablet Take 100 mg by mouth once as needed for Migraine.  polyethylene glycol (MIRALAX) 17 gram/dose powder Take 17 g by mouth daily as needed.  nitroglycerin (NITROSTAT) 0.4 mg SL tablet by SubLINGual route every five (5) minutes as needed for Chest Pain.            medication changes made today: inc seroquel 600mg daily for mood stabalization, cont lithium 600mg for mood stabilization, cont xanax 0.5mg daily PRN anxiety , cont ambien 5-10mg PRN sleep, cont cymbalta 90 mg for depression    2. Counseling and coordination of care including instructions for treatment, risks/benefits, risk factor reduction and patient/family education. He agrees with the plan. Patient instructed to call with any side effects, questions or issues. 3.  Follow-up Disposition:  Return in about 8 weeks (around 1/9/2019). 4. Ind and couples therapy    11/14/2018  Bharti Smith NP

## 2018-11-23 ENCOUNTER — TELEPHONE (OUTPATIENT)
Dept: BEHAVIORAL/MENTAL HEALTH CLINIC | Age: 53
End: 2018-11-23

## 2018-11-23 NOTE — TELEPHONE ENCOUNTER
While travelling around yesterday, patient misplaced Xanax. He and his family have looked everywhere for it, including the houses and his truck, and they will continue to look today. He is requesting an early refill. He will contact the office if he by chance finds it. Also understands it will be Monday before he gets a response, patient okay with that.  obtained, LRD 11/8, 10/2, 8/15.

## 2018-12-06 DIAGNOSIS — F31.71 BIPOLAR DISORDER, IN PARTIAL REMISSION, MOST RECENT EPISODE HYPOMANIC (HCC): ICD-10-CM

## 2018-12-06 DIAGNOSIS — F43.10 PTSD (POST-TRAUMATIC STRESS DISORDER): ICD-10-CM

## 2018-12-06 RX ORDER — LITHIUM CARBONATE 300 MG/1
CAPSULE ORAL
Qty: 60 CAP | Refills: 0 | OUTPATIENT
Start: 2018-12-06

## 2019-02-04 DIAGNOSIS — F31.71 BIPOLAR DISORDER, IN PARTIAL REMISSION, MOST RECENT EPISODE HYPOMANIC (HCC): ICD-10-CM

## 2019-02-04 DIAGNOSIS — F43.10 PTSD (POST-TRAUMATIC STRESS DISORDER): ICD-10-CM

## 2019-02-04 RX ORDER — LITHIUM CARBONATE 300 MG/1
CAPSULE ORAL
Qty: 60 CAP | Refills: 0 | Status: SHIPPED | OUTPATIENT
Start: 2019-02-04 | End: 2019-03-04 | Stop reason: SDUPTHER

## 2019-02-11 RX ORDER — QUETIAPINE 200 MG/1
TABLET, FILM COATED, EXTENDED RELEASE ORAL
Qty: 30 TAB | Refills: 1 | Status: SHIPPED | OUTPATIENT
Start: 2019-02-11 | End: 2019-05-10 | Stop reason: SDUPTHER

## 2019-02-14 DIAGNOSIS — F31.71 BIPOLAR DISORDER, IN PARTIAL REMISSION, MOST RECENT EPISODE HYPOMANIC (HCC): ICD-10-CM

## 2019-02-14 DIAGNOSIS — F43.10 PTSD (POST-TRAUMATIC STRESS DISORDER): ICD-10-CM

## 2019-02-14 RX ORDER — ALPRAZOLAM 0.5 MG/1
TABLET ORAL
Qty: 60 TAB | Refills: 0 | Status: CANCELLED | OUTPATIENT
Start: 2019-02-14

## 2019-02-14 RX ORDER — QUETIAPINE 400 MG/1
TABLET, FILM COATED, EXTENDED RELEASE ORAL
Qty: 30 TAB | Refills: 1 | Status: SHIPPED | OUTPATIENT
Start: 2019-02-14 | End: 2019-06-19 | Stop reason: SDUPTHER

## 2019-02-14 RX ORDER — DULOXETIN HYDROCHLORIDE 30 MG/1
CAPSULE, DELAYED RELEASE ORAL
Qty: 30 CAP | Refills: 1 | Status: SHIPPED | OUTPATIENT
Start: 2019-02-14 | End: 2019-04-12 | Stop reason: SDUPTHER

## 2019-02-19 DIAGNOSIS — F31.71 BIPOLAR DISORDER, IN PARTIAL REMISSION, MOST RECENT EPISODE HYPOMANIC (HCC): ICD-10-CM

## 2019-02-19 RX ORDER — ALPRAZOLAM 0.5 MG/1
TABLET ORAL
Qty: 60 TAB | Refills: 0 | OUTPATIENT
Start: 2019-02-19 | End: 2019-03-19 | Stop reason: SDUPTHER

## 2019-02-25 DIAGNOSIS — F43.10 PTSD (POST-TRAUMATIC STRESS DISORDER): ICD-10-CM

## 2019-02-25 DIAGNOSIS — F31.71 BIPOLAR DISORDER, IN PARTIAL REMISSION, MOST RECENT EPISODE HYPOMANIC (HCC): ICD-10-CM

## 2019-02-25 RX ORDER — ZOLPIDEM TARTRATE 10 MG/1
TABLET ORAL
Qty: 30 TAB | Refills: 1 | OUTPATIENT
Start: 2019-02-25 | End: 2019-06-19 | Stop reason: SDUPTHER

## 2019-02-25 RX ORDER — ZOLPIDEM TARTRATE 10 MG/1
TABLET ORAL
Qty: 30 TAB | Refills: 1 | Status: CANCELLED | OUTPATIENT
Start: 2019-02-25

## 2019-03-04 DIAGNOSIS — F43.10 PTSD (POST-TRAUMATIC STRESS DISORDER): ICD-10-CM

## 2019-03-04 DIAGNOSIS — F31.71 BIPOLAR DISORDER, IN PARTIAL REMISSION, MOST RECENT EPISODE HYPOMANIC (HCC): ICD-10-CM

## 2019-03-04 RX ORDER — LITHIUM CARBONATE 300 MG/1
CAPSULE ORAL
Qty: 60 CAP | Refills: 0 | Status: SHIPPED | OUTPATIENT
Start: 2019-03-04 | End: 2019-04-01 | Stop reason: SDUPTHER

## 2019-03-19 ENCOUNTER — OFFICE VISIT (OUTPATIENT)
Dept: BEHAVIORAL/MENTAL HEALTH CLINIC | Age: 54
End: 2019-03-19

## 2019-03-19 VITALS
SYSTOLIC BLOOD PRESSURE: 123 MMHG | HEART RATE: 87 BPM | BODY MASS INDEX: 28.62 KG/M2 | WEIGHT: 235 LBS | HEIGHT: 76 IN | DIASTOLIC BLOOD PRESSURE: 52 MMHG

## 2019-03-19 DIAGNOSIS — F43.10 PTSD (POST-TRAUMATIC STRESS DISORDER): ICD-10-CM

## 2019-03-19 DIAGNOSIS — F31.71 BIPOLAR DISORDER, IN PARTIAL REMISSION, MOST RECENT EPISODE HYPOMANIC (HCC): Primary | ICD-10-CM

## 2019-03-19 RX ORDER — ALPRAZOLAM 0.5 MG/1
TABLET ORAL
Qty: 60 TAB | Refills: 0 | Status: SHIPPED | OUTPATIENT
Start: 2019-04-30 | End: 2019-05-10 | Stop reason: SDUPTHER

## 2019-03-19 NOTE — PROGRESS NOTES
CHIEF COMPLAINT:  Christiano Bragg is a 49 y.o. male and was seen today for follow-up of psychiatric condition and psychotropic medication management. HPI:    Malissa Norton reports the following psychiatric symptoms:  depression, agitation, anxiety and hypomania/anette. The symptoms have been present for months and are of moderate/low severity with current medications. Pt reports with med management he has a better baseline level of functioning. He still has some mood swings but they are less severe and less frequent overall. He reports ongoing anxiety. Pt reports benefit from current medications. He is here today to review current medications. FAMILY/SOCIAL HX: daughter moved out    REVIEW OF SYSTEMS:  Psychiatric:  depression, anxiety, agitation, hypomania/anette  Appetite:variable, low motivation to cook for himself   Sleep: poor with DMS (maintaining sleep)   Neuro: chronic    Visit Vitals  /52   Pulse 87   Ht 6' 4\" (1.93 m)   Wt 106.6 kg (235 lb)   BMI 28.61 kg/m²       Side Effects:  chronic    MENTAL STATUS EXAM:   Sensorium  oriented to time, place and person   Relations cooperative   Appearance:  age appropriate and casually dressed   Motor Behavior:  gait stable and within normal limits   Speech:  normal volume   Thought Process: goal directed   Thought Content free of delusions and free of hallucinations   Suicidal ideations no intention   Homicidal ideations no intention   Mood:  Depressed, irritable   Affect:  Irritable/sad   Memory recent  adequate   Memory remote:  adequate   Concentration:  adequate   Abstraction:  abstract   Insight:  fair   Reliability fair   Judgment:  fair     MEDICAL DECISION MAKING:  Problems addressed today:    ICD-10-CM ICD-9-CM    1. Bipolar disorder, in partial remission, [facility] F31.71 296.80 ALPRAZolam (XANAX) 0.5 mg tablet   2.  PTSD (post-traumatic stress disorder) F43.10 309.81        Assessment:   Malissa Norton is responding to treatment overall. Currently he is recovering from a depressed and agitated phase. Reviewed current dosing and pt reports he has not had an improvement from higher dose of seroquel so will decrease back to 400 mg. Reviewed other meds and dosages and pt reports distinct benefit from each medication. Have reduced polypharmacy somewhat but current plans is of benefit. Last lithium level from 9/4/18 was in therapeutic range. Discussed use of benzo's. Will hold ambien for now as it has not been as effective. Will continue with use of alprazolam. Reviewed benzo safety and prn guidelines. Reviewed long term effects of chronic use of benzo's and pt agrees to prn use and not daily use. Pt discussed recent stressors. He has restarted ind therapy with benefit and states he would like to go more regularly. Provided support and encouragement. Plan:   1. Current Outpatient Medications   Medication Sig Dispense Refill    [START ON 4/30/2019] ALPRAZolam (XANAX) 0.5 mg tablet TAKE ONE TABLET BY MOUTH TWICE A DAY AS NEEDED. Do not take with other benzo's, alcohol or narcotics. 60 Tab 0    lithium carbonate 300 mg capsule TAKE TWO CAPSULES BY MOUTH EVERY NIGHT AT BEDTIME 60 Cap 0    zolpidem (AMBIEN) 10 mg tablet TAKE ONE TABLET BY MOUTH AT BEDTIME AS NEEDED FOR SLEEP, DO NOT TAKE WITH NARCOTICS, BENZOS OR ALCOHOL. 30 Tab 1    QUEtiapine SR (SEROQUEL XR) 400 mg sr tablet TAKE ONE TABLET BY MOUTH EVERY NIGHT AT BEDTIME 30 Tab 1    DULoxetine (CYMBALTA) 30 mg capsule TAKE ONE CAPSULE BY MOUTH DAILY (TAKE ALONG WITH 60MG) 30 Cap 1    QUEtiapine SR (SEROQUEL XR) 200 mg XR tablet TAKE ONE TABLET BY MOUTH NIGHTLY. TAKE WITH 400MG TABLET FOR A TOTAL DOSE OF 600MG 30 Tab 1    DULoxetine (CYMBALTA) 60 mg capsule Take 1 Cap by mouth daily. 60 Cap 2    topiramate (TOPAMAX) 50 mg tablet Take  by mouth two (2) times a day.       fluticasone-salmeterol (ADVAIR DISKUS) 100-50 mcg/dose diskus inhaler Take 1 Puff by inhalation every twelve (12) hours.      loratadine (CLARITIN) 10 mg tablet Take 10 mg by mouth daily.  fentaNYL (DURAGESIC) 50 mcg/hr PATCH 1 Patch by TransDERmal route every fourty-eight (48) hours.  pregabalin (LYRICA) 150 mg capsule Take 150 mg by mouth three (3) times daily.  oxyCODONE IR (OXY-IR) 15 mg immediate release tablet Take 15 mg by mouth three (3) times daily as needed for Pain.  testosterone cypionate (DEPOTESTOTERONE CYPIONATE) 200 mg/mL injection by IntraMUSCular route every fourteen (14) days.  levothyroxine (SYNTHROID) 125 mcg tablet Take  by mouth Daily (before breakfast).  cholecalciferol, vitamin D3, (VITAMIN D3) 2,000 unit tab Take  by mouth.  metoprolol (LOPRESSOR) 25 mg tablet Take  by mouth daily.  rosuvastatin (CRESTOR) 40 mg tablet Take 40 mg by mouth nightly.  SUMAtriptan (IMITREX) 100 mg tablet Take 100 mg by mouth once as needed for Migraine.  polyethylene glycol (MIRALAX) 17 gram/dose powder Take 17 g by mouth daily as needed.  nitroglycerin (NITROSTAT) 0.4 mg SL tablet by SubLINGual route every five (5) minutes as needed for Chest Pain.            medication changes made today: dec seroquel 400mg daily for mood stabalization, cont lithium 600mg for mood stabilization, cont xanax 0.5mg daily PRN anxiety hold ambien 5-10mg PRN sleep, cont cymbalta 90 mg for depression    2. Counseling and coordination of care including instructions for treatment, risks/benefits, risk factor reduction and patient/family education. He agrees with the plan. Patient instructed to call with any side effects, questions or issues. 3.  Follow-up Disposition:  Return in about 3 months (around 6/19/2019).      4. Ind therapy    3/19/2019  Perez Cole NP

## 2019-03-30 DIAGNOSIS — F43.10 PTSD (POST-TRAUMATIC STRESS DISORDER): ICD-10-CM

## 2019-03-30 DIAGNOSIS — F31.71 BIPOLAR DISORDER, IN PARTIAL REMISSION, MOST RECENT EPISODE HYPOMANIC (HCC): ICD-10-CM

## 2019-04-01 DIAGNOSIS — F43.10 PTSD (POST-TRAUMATIC STRESS DISORDER): ICD-10-CM

## 2019-04-01 DIAGNOSIS — F31.71 BIPOLAR DISORDER, IN PARTIAL REMISSION, MOST RECENT EPISODE HYPOMANIC (HCC): ICD-10-CM

## 2019-04-01 RX ORDER — LITHIUM CARBONATE 300 MG/1
CAPSULE ORAL
Qty: 60 CAP | Refills: 2 | Status: SHIPPED | OUTPATIENT
Start: 2019-04-01 | End: 2019-08-29 | Stop reason: SDUPTHER

## 2019-04-01 RX ORDER — LITHIUM CARBONATE 300 MG/1
CAPSULE ORAL
Qty: 60 CAP | Refills: 0 | Status: SHIPPED | OUTPATIENT
Start: 2019-04-01 | End: 2019-06-19 | Stop reason: SDUPTHER

## 2019-04-02 ENCOUNTER — TELEPHONE (OUTPATIENT)
Dept: BEHAVIORAL/MENTAL HEALTH CLINIC | Age: 54
End: 2019-04-02

## 2019-04-02 NOTE — TELEPHONE ENCOUNTER
CORI, Patient called to let you know what made him better. He is no longer taking Ambien and He is taking the 600 mg of Seroquel. He said you can call him if you need to, but he is doing great. He will see you in June.

## 2019-04-11 ENCOUNTER — TELEPHONE (OUTPATIENT)
Dept: BEHAVIORAL/MENTAL HEALTH CLINIC | Age: 54
End: 2019-04-11

## 2019-04-11 DIAGNOSIS — F31.71 BIPOLAR DISORDER, IN PARTIAL REMISSION, MOST RECENT EPISODE HYPOMANIC (HCC): ICD-10-CM

## 2019-04-11 RX ORDER — ALPRAZOLAM 0.5 MG/1
TABLET ORAL
Qty: 60 TAB | Refills: 0 | OUTPATIENT
Start: 2019-04-30

## 2019-04-11 NOTE — TELEPHONE ENCOUNTER
Patient calls to ask why his clonazepam script is post dated for 4/30. Patient informed 2/25 script has a refill. He says his bottle says no refills, but he will call the pharmacy and let us know if there's any issues.      was obtained, LRD 2/19, 1/20

## 2019-04-12 DIAGNOSIS — F43.10 PTSD (POST-TRAUMATIC STRESS DISORDER): ICD-10-CM

## 2019-04-12 DIAGNOSIS — F31.71 BIPOLAR DISORDER, IN PARTIAL REMISSION, MOST RECENT EPISODE HYPOMANIC (HCC): ICD-10-CM

## 2019-04-12 RX ORDER — DULOXETIN HYDROCHLORIDE 30 MG/1
CAPSULE, DELAYED RELEASE ORAL
Qty: 30 CAP | Refills: 1 | Status: SHIPPED | OUTPATIENT
Start: 2019-04-12 | End: 2019-06-07 | Stop reason: SDUPTHER

## 2019-04-12 RX ORDER — ALPRAZOLAM 0.5 MG/1
TABLET ORAL
Qty: 60 TAB | Refills: 0 | OUTPATIENT
Start: 2019-04-12

## 2019-05-10 DIAGNOSIS — F43.10 PTSD (POST-TRAUMATIC STRESS DISORDER): ICD-10-CM

## 2019-05-10 DIAGNOSIS — F31.71 BIPOLAR DISORDER, IN PARTIAL REMISSION, MOST RECENT EPISODE HYPOMANIC (HCC): ICD-10-CM

## 2019-05-13 RX ORDER — ALPRAZOLAM 0.5 MG/1
TABLET ORAL
Qty: 60 TAB | Refills: 0 | OUTPATIENT
Start: 2019-05-13 | End: 2019-06-19 | Stop reason: SDUPTHER

## 2019-05-13 RX ORDER — QUETIAPINE 400 MG/1
TABLET, FILM COATED, EXTENDED RELEASE ORAL
Qty: 30 TAB | Refills: 2 | Status: SHIPPED | OUTPATIENT
Start: 2019-05-13 | End: 2019-06-19 | Stop reason: SDUPTHER

## 2019-05-13 RX ORDER — QUETIAPINE 200 MG/1
TABLET, FILM COATED, EXTENDED RELEASE ORAL
Qty: 30 TAB | Refills: 2 | Status: SHIPPED | OUTPATIENT
Start: 2019-05-13 | End: 2019-06-19 | Stop reason: DRUGHIGH

## 2019-06-07 DIAGNOSIS — F43.10 PTSD (POST-TRAUMATIC STRESS DISORDER): ICD-10-CM

## 2019-06-07 DIAGNOSIS — F31.71 BIPOLAR DISORDER, IN PARTIAL REMISSION, MOST RECENT EPISODE HYPOMANIC (HCC): ICD-10-CM

## 2019-06-10 RX ORDER — DULOXETIN HYDROCHLORIDE 30 MG/1
CAPSULE, DELAYED RELEASE ORAL
Qty: 30 CAP | Refills: 0 | Status: SHIPPED | OUTPATIENT
Start: 2019-06-10 | End: 2019-06-19 | Stop reason: SDUPTHER

## 2019-06-19 ENCOUNTER — OFFICE VISIT (OUTPATIENT)
Dept: BEHAVIORAL/MENTAL HEALTH CLINIC | Age: 54
End: 2019-06-19

## 2019-06-19 VITALS
WEIGHT: 226 LBS | BODY MASS INDEX: 27.52 KG/M2 | SYSTOLIC BLOOD PRESSURE: 123 MMHG | DIASTOLIC BLOOD PRESSURE: 67 MMHG | HEART RATE: 79 BPM | HEIGHT: 76 IN

## 2019-06-19 DIAGNOSIS — F43.10 PTSD (POST-TRAUMATIC STRESS DISORDER): ICD-10-CM

## 2019-06-19 DIAGNOSIS — F31.71 BIPOLAR DISORDER, IN PARTIAL REMISSION, MOST RECENT EPISODE HYPOMANIC (HCC): Primary | ICD-10-CM

## 2019-06-19 DIAGNOSIS — F31.71 BIPOLAR DISORDER, IN PARTIAL REMISSION, MOST RECENT EPISODE HYPOMANIC (HCC): ICD-10-CM

## 2019-06-19 RX ORDER — DULOXETIN HYDROCHLORIDE 30 MG/1
90 CAPSULE, DELAYED RELEASE ORAL DAILY
Qty: 90 CAP | Refills: 2 | Status: SHIPPED | OUTPATIENT
Start: 2019-06-19 | End: 2019-06-19 | Stop reason: SDUPTHER

## 2019-06-19 RX ORDER — ALPRAZOLAM 0.5 MG/1
TABLET ORAL
Qty: 60 TAB | Refills: 0 | Status: SHIPPED | OUTPATIENT
Start: 2019-06-19 | End: 2019-08-14 | Stop reason: SDUPTHER

## 2019-06-19 RX ORDER — QUETIAPINE 300 MG/1
600 TABLET, FILM COATED, EXTENDED RELEASE ORAL
Qty: 60 TAB | Refills: 2 | Status: SHIPPED | OUTPATIENT
Start: 2019-06-19 | End: 2019-12-03 | Stop reason: SDUPTHER

## 2019-06-19 RX ORDER — DULOXETIN HYDROCHLORIDE 60 MG/1
60 CAPSULE, DELAYED RELEASE ORAL DAILY
Qty: 30 CAP | Refills: 2 | Status: SHIPPED | OUTPATIENT
Start: 2019-06-19 | End: 2019-12-04

## 2019-06-19 RX ORDER — DULOXETIN HYDROCHLORIDE 30 MG/1
30 CAPSULE, DELAYED RELEASE ORAL DAILY
Qty: 30 CAP | Refills: 2 | Status: SHIPPED | OUTPATIENT
Start: 2019-06-19 | End: 2019-10-09 | Stop reason: SDUPTHER

## 2019-06-19 RX ORDER — ZOLPIDEM TARTRATE 10 MG/1
TABLET ORAL
Qty: 30 TAB | Refills: 1 | Status: SHIPPED | OUTPATIENT
Start: 2019-06-19 | End: 2019-10-25 | Stop reason: SDUPTHER

## 2019-06-19 NOTE — PROGRESS NOTES
CHIEF COMPLAINT:  Nasreen Peralta is a 49 y.o. male and was seen today for follow-up of psychiatric condition and psychotropic medication management. HPI:    Williams Nascimento reports the following psychiatric symptoms: agitation, hypomania/anette, depression and anxiety. The symptoms have been present for months and are of moderate/high severity. The symptoms occur more frequently at this time. Pt reports increased stress and problems with sleep. Pt reports benefit from current medications overall. He is here today to review current treatment plan. FAMILY/SOCIAL HX: financial stressors    REVIEW OF SYSTEMS:  Psychiatric:  depression, anxiety, agitation, hypomania/anette  Appetite:fair   Sleep: poor with DIMS (difficulty initiating & maintaining sleep), has not used [employer] x2 months   Neuro: chronic    Visit Vitals  /67   Pulse 79   Ht 6' 4\" (1.93 m)   Wt 102.5 kg (226 lb)   BMI 27.51 kg/m²       Side Effects:  none    MENTAL STATUS EXAM:   Sensorium  oriented to time, place and person   Relations cooperative   Appearance:  age appropriate and casually dressed   Motor Behavior:  gait stable and within normal limits   Speech:  normal volume   Thought Process: goal directed   Thought Content free of delusions and free of hallucinations   Suicidal ideations no intention   Homicidal ideations no intention   Mood:  depressed and irritable   Affect:  constricted and depressed   Memory recent  adequate   Memory remote:  adequate   Concentration:  adequate   Abstraction:  abstract   Insight:  fair   Reliability fair   Judgment:  fair     MEDICAL DECISION MAKING:  Problems addressed today:    ICD-10-CM ICD-9-CM    1. Bipolar disorder, in partial remission, most recent episode hypomanic ([facility]) F31.71 296.80 DULoxetine (CYMBALTA) 30 mg capsule      QUEtiapine SR (SEROQUEL XR) 300 mg sr tablet      zolpidem (AMBIEN) 10 mg tablet      ALPRAZolam (XANAX) 0.5 mg tablet   2.  PTSD (post-traumatic stress disorder) F43.10 309.81 DULoxetine (CYMBALTA) 30 mg capsule      QUEtiapine SR (SEROQUEL XR) 300 mg sr tablet      zolpidem (AMBIEN) 10 mg tablet       Assessment:   Christopher Seay is responding to treatment overall. Currently he has been experiencing increased mood symptoms. Reviewed current meds and dosages. No changes needed. He had tried a lower dose of seroquel but he started to experience increased mood lability and so he re-started 600 mg dose. Reviewed use of benzo's. Pt has not been sleeping well. Reviewed risk vs benefit of re-starting ambien and use of benzo's overall. Agreed to re-start at this time on a prn basis. Reviewed benzo safety and prn guidelines. Pt has been working on decreasing use of prn alprazolam.  Pt discussed recent stressors. Provided support and encouragement. Plan:   1. Current Outpatient Medications   Medication Sig Dispense Refill    DULoxetine (CYMBALTA) 30 mg capsule Take 3 Caps by mouth daily. 90 Cap 2    QUEtiapine SR (SEROQUEL XR) 300 mg sr tablet Take 2 Tabs by mouth nightly. 60 Tab 2    zolpidem (AMBIEN) 10 mg tablet TAKE ONE TABLET BY MOUTH AT BEDTIME AS NEEDED FOR SLEEP, DO NOT TAKE WITH NARCOTICS, BENZOS OR ALCOHOL. 30 Tab 1    ALPRAZolam (XANAX) 0.5 mg tablet TAKE ONE TABLET BY MOUTH TWICE A DAY AS NEEDED ( DO NOT TAKE WITH OTHER BENZO\"S ALCOHOL OR NARCOTICS 60 Tab 0    lithium carbonate 300 mg capsule TAKE TWO CAPSULES BY MOUTH EVERY NIGHT AT BEDTIME 60 Cap 2    topiramate (TOPAMAX) 50 mg tablet Take  by mouth two (2) times a day.  fluticasone-salmeterol (ADVAIR DISKUS) 100-50 mcg/dose diskus inhaler Take 1 Puff by inhalation every twelve (12) hours.  loratadine (CLARITIN) 10 mg tablet Take 10 mg by mouth daily.  fentaNYL (DURAGESIC) 50 mcg/hr PATCH 1 Patch by TransDERmal route every fourty-eight (48) hours.  pregabalin (LYRICA) 150 mg capsule Take 150 mg by mouth three (3) times daily.       oxyCODONE IR (OXY-IR) 15 mg immediate release tablet Take 15 mg by mouth three (3) times daily as needed for Pain.  testosterone cypionate (DEPOTESTOTERONE CYPIONATE) 200 mg/mL injection by IntraMUSCular route every fourteen (14) days.  levothyroxine (SYNTHROID) 125 mcg tablet Take  by mouth Daily (before breakfast).  cholecalciferol, vitamin D3, (VITAMIN D3) 2,000 unit tab Take  by mouth.  metoprolol (LOPRESSOR) 25 mg tablet Take  by mouth daily.  rosuvastatin (CRESTOR) 40 mg tablet Take 40 mg by mouth nightly.  SUMAtriptan (IMITREX) 100 mg tablet Take 100 mg by mouth once as needed for Migraine.  polyethylene glycol (MIRALAX) 17 gram/dose powder Take 17 g by mouth daily as needed.  nitroglycerin (NITROSTAT) 0.4 mg SL tablet by SubLINGual route every five (5) minutes as needed for Chest Pain.            medication changes made today: cont seroquel 600mg daily for mood stabalization, cont lithium 600mg for mood stabilization, cont xanax 0.5mg daily PRN anxiety, re-start ambien 5-10mg PRN sleep, cont cymbalta 90 mg for depression    2. Counseling and coordination of care including instructions for treatment, risks/benefits, risk factor reduction and patient/family education. He agrees with the plan. Patient instructed to call with any side effects, questions or issues. 3.    Follow-up and Dispositions    · Return in about 3 months (around 9/19/2019).        4. Ind therapy and Couples therapy    6/19/2019  Alber Engle NP

## 2019-08-14 DIAGNOSIS — F31.71 BIPOLAR DISORDER, IN PARTIAL REMISSION, MOST RECENT EPISODE HYPOMANIC (HCC): ICD-10-CM

## 2019-08-14 RX ORDER — ALPRAZOLAM 0.5 MG/1
TABLET ORAL
Qty: 60 TAB | Refills: 0 | Status: SHIPPED | OUTPATIENT
Start: 2019-08-14 | End: 2019-10-01 | Stop reason: SDUPTHER

## 2019-08-29 DIAGNOSIS — F43.10 PTSD (POST-TRAUMATIC STRESS DISORDER): ICD-10-CM

## 2019-08-29 DIAGNOSIS — F31.71 BIPOLAR DISORDER, IN PARTIAL REMISSION, MOST RECENT EPISODE HYPOMANIC (HCC): ICD-10-CM

## 2019-08-29 RX ORDER — LITHIUM CARBONATE 300 MG/1
CAPSULE ORAL
Qty: 60 CAP | Refills: 0 | Status: SHIPPED | OUTPATIENT
Start: 2019-08-29 | End: 2019-10-02 | Stop reason: SDUPTHER

## 2019-09-27 DIAGNOSIS — F31.71 BIPOLAR DISORDER, IN PARTIAL REMISSION, MOST RECENT EPISODE HYPOMANIC (HCC): ICD-10-CM

## 2019-09-30 RX ORDER — ALPRAZOLAM 0.5 MG/1
TABLET ORAL
Qty: 60 TAB | Refills: 0 | OUTPATIENT
Start: 2019-09-30

## 2019-10-01 DIAGNOSIS — F31.71 BIPOLAR DISORDER, IN PARTIAL REMISSION, MOST RECENT EPISODE HYPOMANIC (HCC): ICD-10-CM

## 2019-10-01 RX ORDER — ALPRAZOLAM 0.5 MG/1
TABLET ORAL
Qty: 60 TAB | Refills: 0 | Status: SHIPPED | OUTPATIENT
Start: 2019-10-01 | End: 2019-11-14 | Stop reason: SDUPTHER

## 2019-10-02 DIAGNOSIS — F31.71 BIPOLAR DISORDER, IN PARTIAL REMISSION, MOST RECENT EPISODE HYPOMANIC (HCC): ICD-10-CM

## 2019-10-02 DIAGNOSIS — F43.10 PTSD (POST-TRAUMATIC STRESS DISORDER): ICD-10-CM

## 2019-10-02 RX ORDER — LITHIUM CARBONATE 300 MG/1
CAPSULE ORAL
Qty: 60 CAP | Refills: 0 | Status: SHIPPED | OUTPATIENT
Start: 2019-10-02 | End: 2019-11-04 | Stop reason: SDUPTHER

## 2019-10-09 DIAGNOSIS — F43.10 PTSD (POST-TRAUMATIC STRESS DISORDER): ICD-10-CM

## 2019-10-09 DIAGNOSIS — F31.71 BIPOLAR DISORDER, IN PARTIAL REMISSION, MOST RECENT EPISODE HYPOMANIC (HCC): ICD-10-CM

## 2019-10-09 RX ORDER — DULOXETIN HYDROCHLORIDE 30 MG/1
CAPSULE, DELAYED RELEASE ORAL
Qty: 30 CAP | Refills: 1 | Status: SHIPPED | OUTPATIENT
Start: 2019-10-09 | End: 2019-12-04

## 2019-10-25 DIAGNOSIS — F31.71 BIPOLAR DISORDER, IN PARTIAL REMISSION, MOST RECENT EPISODE HYPOMANIC (HCC): ICD-10-CM

## 2019-10-25 DIAGNOSIS — F43.10 PTSD (POST-TRAUMATIC STRESS DISORDER): ICD-10-CM

## 2019-10-28 RX ORDER — ZOLPIDEM TARTRATE 10 MG/1
TABLET ORAL
Qty: 30 TAB | Refills: 0 | Status: SHIPPED | OUTPATIENT
Start: 2019-10-28 | End: 2019-12-04 | Stop reason: SDUPTHER

## 2019-11-04 DIAGNOSIS — F43.10 PTSD (POST-TRAUMATIC STRESS DISORDER): ICD-10-CM

## 2019-11-04 DIAGNOSIS — F31.71 BIPOLAR DISORDER, IN PARTIAL REMISSION, MOST RECENT EPISODE HYPOMANIC (HCC): ICD-10-CM

## 2019-11-04 RX ORDER — LITHIUM CARBONATE 300 MG/1
CAPSULE ORAL
Qty: 60 CAP | Refills: 2 | Status: SHIPPED | OUTPATIENT
Start: 2019-11-04 | End: 2020-02-03

## 2019-11-04 RX ORDER — LITHIUM CARBONATE 300 MG/1
CAPSULE ORAL
Qty: 60 CAP | Refills: 0 | Status: CANCELLED | OUTPATIENT
Start: 2019-11-04

## 2019-11-05 DIAGNOSIS — F43.10 PTSD (POST-TRAUMATIC STRESS DISORDER): ICD-10-CM

## 2019-11-05 DIAGNOSIS — F31.71 BIPOLAR DISORDER, IN PARTIAL REMISSION, MOST RECENT EPISODE HYPOMANIC (HCC): ICD-10-CM

## 2019-11-05 RX ORDER — LITHIUM CARBONATE 300 MG/1
CAPSULE ORAL
Qty: 60 CAP | Refills: 0 | OUTPATIENT
Start: 2019-11-05

## 2019-11-14 DIAGNOSIS — F31.71 BIPOLAR DISORDER, IN PARTIAL REMISSION, MOST RECENT EPISODE HYPOMANIC (HCC): ICD-10-CM

## 2019-11-18 RX ORDER — ALPRAZOLAM 0.5 MG/1
TABLET ORAL
Qty: 60 TAB | Refills: 0 | Status: SHIPPED | OUTPATIENT
Start: 2019-11-18 | End: 2020-01-09

## 2019-12-03 DIAGNOSIS — F43.10 PTSD (POST-TRAUMATIC STRESS DISORDER): ICD-10-CM

## 2019-12-03 DIAGNOSIS — F31.71 BIPOLAR DISORDER, IN PARTIAL REMISSION, MOST RECENT EPISODE HYPOMANIC (HCC): ICD-10-CM

## 2019-12-04 ENCOUNTER — OFFICE VISIT (OUTPATIENT)
Dept: BEHAVIORAL/MENTAL HEALTH CLINIC | Age: 54
End: 2019-12-04

## 2019-12-04 VITALS
BODY MASS INDEX: 24.72 KG/M2 | HEIGHT: 76 IN | DIASTOLIC BLOOD PRESSURE: 64 MMHG | SYSTOLIC BLOOD PRESSURE: 98 MMHG | WEIGHT: 203 LBS | HEART RATE: 95 BPM

## 2019-12-04 DIAGNOSIS — F31.71 BIPOLAR DISORDER, IN PARTIAL REMISSION, MOST RECENT EPISODE HYPOMANIC (HCC): Primary | ICD-10-CM

## 2019-12-04 DIAGNOSIS — F43.10 PTSD (POST-TRAUMATIC STRESS DISORDER): ICD-10-CM

## 2019-12-04 DIAGNOSIS — F43.21 COMPLICATED GRIEF: ICD-10-CM

## 2019-12-04 RX ORDER — QUETIAPINE 300 MG/1
TABLET, FILM COATED, EXTENDED RELEASE ORAL
Qty: 60 TAB | Refills: 2 | Status: SHIPPED | OUTPATIENT
Start: 2019-12-04 | End: 2019-12-04 | Stop reason: SDUPTHER

## 2019-12-04 RX ORDER — ZOLPIDEM TARTRATE 10 MG/1
10 TABLET ORAL
Qty: 30 TAB | Refills: 0 | Status: SHIPPED | OUTPATIENT
Start: 2019-12-04 | End: 2020-01-09

## 2019-12-04 RX ORDER — QUETIAPINE 400 MG/1
400 TABLET, FILM COATED, EXTENDED RELEASE ORAL
Qty: 30 TAB | Refills: 2 | Status: SHIPPED | OUTPATIENT
Start: 2019-12-04 | End: 2020-03-02

## 2019-12-04 NOTE — PROGRESS NOTES
CHIEF COMPLAINT:  Lakeshia Caldwell is a 49 y.o. male and was seen today for follow-up of psychiatric condition and psychotropic medication management. HPI:    Marina Valerio reports the following psychiatric symptoms:  depression, agitation, anxiety and hypomania. The symptoms have been present for years and are of moderate/high severity. The symptoms occur intermittently and recently he reports some breakthrough symptoms of moderate to moderate high severity. Pt here today for f/u and to review medications. Pt recently had asked to have forms filled out for [facility] regarding use of psychotropics. Pt also shared he was hospitalized approx 1 month ago after taking an antibiotic and experiencing a drug to drug interaction. Pt reports psychotropic med dosages were changed during that hospitalization. No records currently available and information provided by the patient. Pt here today to review current treatment plan. FAMILY/SOCIAL HX: special needs child    REVIEW OF SYSTEMS:  Psychiatric:  depression, agitation, hypomania/anetet, anxiety  Appetite:decreased, pt reports weight loss,  Weight down 23 lbs since last visit 6/19/19  Sleep: poor with DIMS (difficulty initiating & maintaining sleep), reports he only sleeps with use of ambien   Neuro: neuropathy, chronic pain, new onset shoulder pain    Visit Vitals  BP 98/64 (BP 1 Location: Left arm, BP Patient Position: Sitting)   Pulse 95   Ht 6' 4\" (1.93 m)   Wt 92.1 kg (203 lb)   BMI 24.71 kg/m²     BP last 123/67 on 6-19-19    Side Effects:  none reported, daytime somnolence ?, orthostatic hypotension ?     MENTAL STATUS EXAM:   Sensorium  oriented to time, place and person   Relations cooperative   Appearance:  age appropriate and casually dressed, pale and thin, anxious/shaking   Motor Behavior:  gait stable and within normal limits   Speech:  normal volume   Thought Process: goal directed   Thought Content free of delusions and free of hallucinations   Suicidal ideations no intention   Homicidal ideations no intention   Mood:  anxious   Affect:  anxious   Memory recent  adequate   Memory remote:  adequate   Concentration:  adequate   Abstraction:  abstract   Insight:  limited   Reliability fair   Judgment:  limited     MEDICAL DECISION MAKING:  Problems addressed today:    ICD-10-CM ICD-9-CM    1. Bipolar disorder, in partial remission, most recent episode hypomanic (HCC) F31.71 296.80 QUEtiapine SR (SEROQUEL XR) 400 mg sr tablet      zolpidem (AMBIEN) 10 mg tablet   2. PTSD (post-traumatic stress disorder) F43.10 309.81 QUEtiapine SR (SEROQUEL XR) 400 mg sr tablet      zolpidem (AMBIEN) 10 mg tablet   3. Complicated grief O27.90 309.0     Z63.4         Assessment:   Maria M Russo is responding to treatment overall. Currently mood and anxiety symptoms are improved but he continues with mild to moderate severity symptoms even at baseline mostly due to hx of severe childhood trauma. Pt reports approx 1 month ago he was admitted for an inpatient medical admission after passing out secondary to a drug to drug interaction. He reports he was put on an antibiotic and was advised that it may interfere with psychotropics but he did not make any changes at that time. Currently pt with low BP, weight loss and intermittent episodes of feeling dizzy. He reports when he was hospitalized his providers lowered his psychotropic medications. He states seroquel was lowered to 300 mg from 600 mg. He states when he got home mood was cycling between depression and hypomania/agitation every few days. He states QOL is greatly reduced when he is agitated as it impacts family dynamics. Pt was lowered from 90 to 60 mg Duloxetine and Ambien was dc'd. He states he increased seroquel back to 450 mg and restarted ambien for sleep and he was able to stabilize mood. Pt states he thinks lithium levels were stable and that he did have numerous labs drawn.  Due to BP issues will complete weaning off of duloxetine at this time. Pt agrees to sign release to obtain records. Discussed ways to continue to work to reduce polypharmacy while maintaining a stable mood. Reviewed options such as maximizing lithium dose and/or a trial of vraylar or latuda for monotherapy. Will continue to work to decrease benzo use. Have reviewed numerous times risk vs benefit with patient and chronic hyperarousal has warranted prn use. He states he is working to use #30 tabs of both ambien and alprazolam at least every 60 days. Pt requesting DMV paperwork be completed. Due date for paperwork was 11/30/19 but pt was hospitalized and reportedly experienced passing out without a known medial reason. Unclear if this is secondary to drug to drug interaction patient reported or from dosages of current medications. After reviewing records from pain specialist it may also be from narcotic plus benzo use. As noted above will continue to assess necessity of benzo with pt while reviewing risks. Due to BP issues will monitor very carefully. Discussed issues with pt who is in agreement. Pt made aware that I would have to answer DMV accurately. Reviewed treatment goals and importance of getting recent inpatient records. Plan:   1. Current Outpatient Medications   Medication Sig Dispense Refill    QUEtiapine SR (SEROQUEL XR) 400 mg sr tablet Take 1 Tab by mouth nightly. 30 Tab 2    zolpidem (AMBIEN) 10 mg tablet Take 1 Tab by mouth nightly as needed for Sleep. Max Daily Amount: 10 mg. 30 Tab 0    ALPRAZolam (XANAX) 0.5 mg tablet TAKE ONE TABLET BY MOUTH TWICE A DAY, DO NOT TAKE WITH OTHER BENZOS, ALCOHOL, OR NARCOTICS 60 Tab 0    lithium carbonate 300 mg capsule TAKE TWO CAPSULES BY MOUTH EVERY NIGHT AT BEDTIME 60 Cap 2    topiramate (TOPAMAX) 50 mg tablet Take  by mouth two (2) times a day.  fluticasone-salmeterol (ADVAIR DISKUS) 100-50 mcg/dose diskus inhaler Take 1 Puff by inhalation every twelve (12) hours.       loratadine (CLARITIN) 10 mg tablet Take 10 mg by mouth daily.  fentaNYL (DURAGESIC) 50 mcg/hr PATCH 1 Patch by TransDERmal route every fourty-eight (48) hours.  pregabalin (LYRICA) 150 mg capsule Take 150 mg by mouth three (3) times daily.  oxyCODONE IR (OXY-IR) 15 mg immediate release tablet Take 15 mg by mouth three (3) times daily as needed for Pain.  testosterone cypionate (DEPOTESTOTERONE CYPIONATE) 200 mg/mL injection by IntraMUSCular route every fourteen (14) days.  levothyroxine (SYNTHROID) 125 mcg tablet Take  by mouth Daily (before breakfast).  cholecalciferol, vitamin D3, (VITAMIN D3) 2,000 unit tab Take  by mouth.  metoprolol (LOPRESSOR) 25 mg tablet Take  by mouth daily.  rosuvastatin (CRESTOR) 40 mg tablet Take 40 mg by mouth nightly.  SUMAtriptan (IMITREX) 100 mg tablet Take 100 mg by mouth once as needed for Migraine.  polyethylene glycol (MIRALAX) 17 gram/dose powder Take 17 g by mouth daily as needed.  nitroglycerin (NITROSTAT) 0.4 mg SL tablet by SubLINGual route every five (5) minutes as needed for Chest Pain.            medication changes made today: seroquel 400 mg, cont lithium 600 mg at bedtime, wean off of duloxetine, cont alprazolam 0.5 mg prn anxiety, cont ambien 10 mg prn sleep with plan to wean off of benzo's    2. Counseling and coordination of care including instructions for treatment, risks/benefits, risk factor reduction and patient/family education. He agrees with the plan. Patient instructed to call with any side effects, questions or issues. 3.    Follow-up and Dispositions    · Return in about 3 months (around 3/4/2020).        4. F/U regarding continued ind therapy sessions    12/4/2019  Parveen Velarde NP

## 2020-01-08 DIAGNOSIS — F43.10 PTSD (POST-TRAUMATIC STRESS DISORDER): ICD-10-CM

## 2020-01-08 DIAGNOSIS — F31.71 BIPOLAR DISORDER, IN PARTIAL REMISSION, MOST RECENT EPISODE HYPOMANIC (HCC): ICD-10-CM

## 2020-01-09 RX ORDER — ALPRAZOLAM 0.5 MG/1
TABLET ORAL
Qty: 60 TAB | Refills: 0 | Status: SHIPPED | OUTPATIENT
Start: 2020-01-09 | End: 2020-03-02

## 2020-01-09 RX ORDER — ZOLPIDEM TARTRATE 10 MG/1
TABLET ORAL
Qty: 30 TAB | Refills: 0 | Status: SHIPPED | OUTPATIENT
Start: 2020-01-09 | End: 2020-02-19

## 2020-01-13 ENCOUNTER — TELEPHONE (OUTPATIENT)
Dept: BEHAVIORAL/MENTAL HEALTH CLINIC | Age: 55
End: 2020-01-13

## 2020-01-13 NOTE — TELEPHONE ENCOUNTER
Returned call. Pt reports he has had some breakthrough symptoms of mood lability and exacerbated depression/anxiety. Pt provided update on current dosages of medications. Informed pt that he should not make any more changes until we can assess his current symptoms. Reviewed DMV paperwork and will check with PSR to see if it has been sent. Advised pt to call if he has any worsening of symptoms.

## 2020-02-01 DIAGNOSIS — F31.71 BIPOLAR DISORDER, IN PARTIAL REMISSION, MOST RECENT EPISODE HYPOMANIC (HCC): ICD-10-CM

## 2020-02-01 DIAGNOSIS — F43.10 PTSD (POST-TRAUMATIC STRESS DISORDER): ICD-10-CM

## 2020-02-03 RX ORDER — LITHIUM CARBONATE 300 MG/1
CAPSULE ORAL
Qty: 60 CAP | Refills: 1 | Status: SHIPPED | OUTPATIENT
Start: 2020-02-03 | End: 2020-03-12 | Stop reason: SDUPTHER

## 2020-02-17 DIAGNOSIS — F31.71 BIPOLAR DISORDER, IN PARTIAL REMISSION, MOST RECENT EPISODE HYPOMANIC (HCC): ICD-10-CM

## 2020-02-17 DIAGNOSIS — F43.10 PTSD (POST-TRAUMATIC STRESS DISORDER): ICD-10-CM

## 2020-02-17 RX ORDER — DULOXETIN HYDROCHLORIDE 30 MG/1
CAPSULE, DELAYED RELEASE ORAL
Qty: 30 CAP | Refills: 0 | OUTPATIENT
Start: 2020-02-17

## 2020-02-18 DIAGNOSIS — F31.71 BIPOLAR DISORDER, IN PARTIAL REMISSION, MOST RECENT EPISODE HYPOMANIC (HCC): ICD-10-CM

## 2020-02-18 DIAGNOSIS — F43.10 PTSD (POST-TRAUMATIC STRESS DISORDER): ICD-10-CM

## 2020-02-19 RX ORDER — ZOLPIDEM TARTRATE 10 MG/1
TABLET ORAL
Qty: 30 TAB | Refills: 0 | Status: SHIPPED | OUTPATIENT
Start: 2020-02-19 | End: 2020-03-12 | Stop reason: SDUPTHER

## 2020-02-20 DIAGNOSIS — F31.71 BIPOLAR DISORDER, IN PARTIAL REMISSION, MOST RECENT EPISODE HYPOMANIC (HCC): ICD-10-CM

## 2020-02-20 DIAGNOSIS — F43.10 PTSD (POST-TRAUMATIC STRESS DISORDER): ICD-10-CM

## 2020-02-20 RX ORDER — DULOXETIN HYDROCHLORIDE 30 MG/1
CAPSULE, DELAYED RELEASE ORAL
Qty: 30 CAP | Refills: 0 | OUTPATIENT
Start: 2020-02-20

## 2020-02-27 DIAGNOSIS — F43.10 PTSD (POST-TRAUMATIC STRESS DISORDER): ICD-10-CM

## 2020-02-27 DIAGNOSIS — F31.71 BIPOLAR DISORDER, IN PARTIAL REMISSION, MOST RECENT EPISODE HYPOMANIC (HCC): ICD-10-CM

## 2020-02-27 RX ORDER — DULOXETIN HYDROCHLORIDE 30 MG/1
CAPSULE, DELAYED RELEASE ORAL
Qty: 30 CAP | Refills: 0 | Status: SHIPPED | OUTPATIENT
Start: 2020-02-27 | End: 2020-03-12 | Stop reason: SDUPTHER

## 2020-02-27 NOTE — TELEPHONE ENCOUNTER
Pt calls, he has decreased from duloxetine 90mg down to 30mg daily, but when he tried to stop, he did not do well.  Requesting to continue 30mg daily until his next appointment. (also, see previous phone note)

## 2020-02-28 DIAGNOSIS — F31.71 BIPOLAR DISORDER, IN PARTIAL REMISSION, MOST RECENT EPISODE HYPOMANIC (HCC): ICD-10-CM

## 2020-03-01 DIAGNOSIS — F43.10 PTSD (POST-TRAUMATIC STRESS DISORDER): ICD-10-CM

## 2020-03-01 DIAGNOSIS — F31.71 BIPOLAR DISORDER, IN PARTIAL REMISSION, MOST RECENT EPISODE HYPOMANIC (HCC): ICD-10-CM

## 2020-03-02 ENCOUNTER — TELEPHONE (OUTPATIENT)
Dept: BEHAVIORAL/MENTAL HEALTH CLINIC | Age: 55
End: 2020-03-02

## 2020-03-02 RX ORDER — QUETIAPINE 400 MG/1
TABLET, FILM COATED, EXTENDED RELEASE ORAL
Qty: 30 TAB | Refills: 1 | Status: SHIPPED | OUTPATIENT
Start: 2020-03-02 | End: 2020-03-12 | Stop reason: SDUPTHER

## 2020-03-02 RX ORDER — ALPRAZOLAM 0.5 MG/1
TABLET ORAL
Qty: 60 TAB | Refills: 0 | Status: SHIPPED | OUTPATIENT
Start: 2020-03-02 | End: 2020-03-12 | Stop reason: SDUPTHER

## 2020-03-02 NOTE — LETTER
3/9/2020 Re: Mr. Artis Solis [address] [phone] Customer ID #:  To Whom It May Concern: Mr. Artis Solis is an active patient of [address] at [facility]. At his last office visit on December 4, 2019, his Seroquel was decreased. At this time, Mr. Marveen Homans reports treatment plan compliance and is responding to treatment. Should there be any additional questions or concerns, please have Mr. Marveen Homans contact the office.  
 
 
Sincerely, 
 
 
Edison Ken, PhD, PMHNP-BC

## 2020-03-02 NOTE — TELEPHONE ENCOUNTER
Pt calls to ask if we can call [facility] medical to help with him getting his license back. He said we sent the form to [facility] but they have some questions regarding section E and section F.    Pt said they are asking about date of last appt and something about adjusting a script. Their number is [phone].

## 2020-03-06 NOTE — TELEPHONE ENCOUNTER
Letter needed: seroquel was decreased, responding to treatment, las office visit. Fax to number on form.

## 2020-03-12 ENCOUNTER — OFFICE VISIT (OUTPATIENT)
Dept: BEHAVIORAL/MENTAL HEALTH CLINIC | Age: 55
End: 2020-03-12

## 2020-03-12 VITALS
SYSTOLIC BLOOD PRESSURE: 123 MMHG | HEART RATE: 75 BPM | BODY MASS INDEX: 25.94 KG/M2 | DIASTOLIC BLOOD PRESSURE: 78 MMHG | HEIGHT: 76 IN | WEIGHT: 213 LBS

## 2020-03-12 DIAGNOSIS — F43.10 PTSD (POST-TRAUMATIC STRESS DISORDER): ICD-10-CM

## 2020-03-12 DIAGNOSIS — F31.71 BIPOLAR DISORDER, IN PARTIAL REMISSION, MOST RECENT EPISODE HYPOMANIC (HCC): Primary | ICD-10-CM

## 2020-03-12 RX ORDER — LITHIUM CARBONATE 300 MG/1
600 CAPSULE ORAL DAILY
Qty: 60 CAP | Refills: 2 | Status: SHIPPED | OUTPATIENT
Start: 2020-03-12 | End: 2020-04-01

## 2020-03-12 RX ORDER — QUETIAPINE 400 MG/1
TABLET, FILM COATED, EXTENDED RELEASE ORAL
Qty: 30 TAB | Refills: 2 | Status: SHIPPED | OUTPATIENT
Start: 2020-03-12 | End: 2020-06-24 | Stop reason: SDUPTHER

## 2020-03-12 RX ORDER — ZOLPIDEM TARTRATE 10 MG/1
TABLET ORAL
Qty: 30 TAB | Refills: 1 | Status: SHIPPED | OUTPATIENT
Start: 2020-03-12 | End: 2020-05-21

## 2020-03-12 RX ORDER — DULOXETIN HYDROCHLORIDE 30 MG/1
CAPSULE, DELAYED RELEASE ORAL
Qty: 30 CAP | Refills: 2 | Status: SHIPPED | OUTPATIENT
Start: 2020-03-12 | End: 2020-06-15

## 2020-03-12 RX ORDER — QUETIAPINE FUMARATE 50 MG/1
50 TABLET, EXTENDED RELEASE ORAL DAILY
Qty: 30 TAB | Refills: 2 | Status: SHIPPED | OUTPATIENT
Start: 2020-03-12 | End: 2020-06-11

## 2020-03-12 RX ORDER — ALPRAZOLAM 0.5 MG/1
TABLET ORAL
Qty: 60 TAB | Refills: 1 | Status: SHIPPED | OUTPATIENT
Start: 2020-04-02 | End: 2020-07-20

## 2020-03-12 NOTE — PROGRESS NOTES
CHIEF COMPLAINT:  Dorothy Plasencia is a 48 y.o. male and was seen today for follow-up of psychiatric condition and psychotropic medication management. HPI:    Nitin Blanton reports the following psychiatric symptoms:  depression, agitation, anxiety, anette and hypomania. The symptoms have been present for months and are of moderate/high severity. After reducing both seroquel and cymbalta pt noticed extreme agitation and anger. He states he contacted office to restart cymbalta. He restarted cymbalta and states agitation started to stabilize. Overall he reports benefit from current medications. He is here today to review current treatment plan. FAMILY/SOCIAL HX: no changes or updates    REVIEW OF SYSTEMS:  Psychiatric:  depression, anxiety, agitation, hypomania/anette  Appetite:good, had had significant weight loss, now regaining weight   Sleep: fitful and poor with DMS (maintaining sleep)   Neuro: denies    Visit Vitals  /78 (BP 1 Location: Left arm, BP Patient Position: Sitting)   Pulse 75   Ht 6' 4\" (1.93 m)   Wt 96.6 kg (213 lb)   BMI 25.93 kg/m²       Side Effects:  none    MENTAL STATUS EXAM:   Sensorium  oriented to time, place and person   Relations cooperative   Appearance:  age appropriate and casually dressed   Motor Behavior:  gait stable and within normal limits   Speech:  normal volume   Thought Process: goal directed   Thought Content free of delusions and free of hallucinations   Suicidal ideations no intention   Homicidal ideations no intention   Mood:  irritable   Affect:  anxious   Memory recent  adequate   Memory remote:  adequate   Concentration:  adequate   Abstraction:  abstract   Insight:  fair   Reliability fair   Judgment:  fair     MEDICAL DECISION MAKING:  Problems addressed today:    ICD-10-CM ICD-9-CM    1.  Bipolar disorder, in partial remission, most recent episode [facility] ([facility] F31.71 296.80 ALPRAZolam (XANAX) 0.5 mg tablet      zolpidem (AMBIEN) 10 mg tablet      DULoxetine (CYMBALTA) 30 mg capsule      QUEtiapine SR (SEROquel XR) 400 mg sr tablet      lithium carbonate 300 mg capsule   2. PTSD (post-traumatic stress disorder) F43.10 309.81 zolpidem (AMBIEN) 10 mg tablet      DULoxetine (CYMBALTA) 30 mg capsule      QUEtiapine SR (SEROquel XR) 400 mg sr tablet      lithium carbonate 300 mg capsule       Assessment:   Lorrie Ash is responding to treatment overall. Have worked to reduce polypharmacy and find lowest effective doses. Pt had weaned off of cymbalta and found that he was experiencing severe rage/agitation. He restarted and reports he is feeling better now. Reviewed meds and dosing. Agreed to continue with current dose of cymbalta and increase seroquel due to ongoing symptoms of irritability. This may also help with sleep as well. Pt continues to try to limit prn use of ambien and alprazolam. He is aware of benzo safety especially with a narcotic patch for his chronic pain. Reviewed risk vs benefit and at this time prn use is of benefit. Reinforced ongoing ind therapy. Reviewed treatment goals and target symptoms while working to titrate current meds. Plan:   1. Current Outpatient Medications   Medication Sig Dispense Refill    [START ON 4/2/2020] ALPRAZolam (XANAX) 0.5 mg tablet TAKE ONE TABLET BY MOUTH TWICE A DAY ( DO NOT TAKE WITH OTHER BENZOS, ALCOHOL OR NARCOTICS) 60 Tab 1    zolpidem (AMBIEN) 10 mg tablet TAKE ONE TABLET BY MOUTH EVERY NIGHT AT BEDTIME AS NEEDED FOR SLEEP 30 Tab 1    DULoxetine (CYMBALTA) 30 mg capsule TAKE ONE CAPSULE BY MOUTH DAILY 30 Cap 2    QUEtiapine SR (SEROquel XR) 400 mg sr tablet TAKE ONE TABLET BY MOUTH EVERY NIGHT AT BEDTIME 30 Tab 2    QUEtiapine SR (SEROquel XR) 50 mg sr tablet Take 1 Tab by mouth daily. 30 Tab 2    lithium carbonate 300 mg capsule Take 2 Caps by mouth daily. 60 Cap 2    topiramate (TOPAMAX) 50 mg tablet Take  by mouth two (2) times a day.       fluticasone-salmeterol (ADVAIR DISKUS) 100-50 mcg/dose diskus inhaler Take 1 Puff by inhalation every twelve (12) hours.  loratadine (CLARITIN) 10 mg tablet Take 10 mg by mouth daily.  fentaNYL (DURAGESIC) 50 mcg/hr PATCH 1 Patch by TransDERmal route every fourty-eight (48) hours.  pregabalin (LYRICA) 150 mg capsule Take 150 mg by mouth three (3) times daily.  oxyCODONE IR (OXY-IR) 15 mg immediate release tablet Take 15 mg by mouth three (3) times daily as needed for Pain.  testosterone cypionate (DEPOTESTOTERONE CYPIONATE) 200 mg/mL injection by IntraMUSCular route every fourteen (14) days.  levothyroxine (SYNTHROID) 125 mcg tablet Take  by mouth Daily (before breakfast).  cholecalciferol, vitamin D3, (VITAMIN D3) 2,000 unit tab Take  by mouth.  metoprolol (LOPRESSOR) 25 mg tablet Take  by mouth daily.  rosuvastatin (CRESTOR) 40 mg tablet Take 40 mg by mouth nightly.  SUMAtriptan (IMITREX) 100 mg tablet Take 100 mg by mouth once as needed for Migraine.  polyethylene glycol (MIRALAX) 17 gram/dose powder Take 17 g by mouth daily as needed.  nitroglycerin (NITROSTAT) 0.4 mg SL tablet by SubLINGual route every five (5) minutes as needed for Chest Pain.            medication changes made today: inc seroquel 450 mg, cont lithium 600 mg Move to daytime), cont duloxetine 30 mg, cont alprazolam 0.5 mg prn anxiety, cont ambien 10 mg prn sleep with plan to wean off of benzo's    2. Counseling and coordination of care including instructions for treatment, risks/benefits, risk factor reduction and patient/family education. He agrees with the plan. Patient instructed to call with any side effects, questions or issues. 3.    Follow-up and Dispositions    · Return in about 3 months (around 6/12/2020).        4. Ind therapy    3/12/2020  Bee Husain NP

## 2020-03-31 DIAGNOSIS — F43.10 PTSD (POST-TRAUMATIC STRESS DISORDER): ICD-10-CM

## 2020-03-31 DIAGNOSIS — F31.71 BIPOLAR DISORDER, IN PARTIAL REMISSION, MOST RECENT EPISODE HYPOMANIC (HCC): ICD-10-CM

## 2020-04-01 RX ORDER — LITHIUM CARBONATE 300 MG/1
CAPSULE ORAL
Qty: 60 CAP | Refills: 2 | Status: SHIPPED | OUTPATIENT
Start: 2020-04-01 | End: 2020-06-24 | Stop reason: SDUPTHER

## 2020-05-20 DIAGNOSIS — F31.71 BIPOLAR DISORDER, IN PARTIAL REMISSION, MOST RECENT EPISODE HYPOMANIC (HCC): ICD-10-CM

## 2020-05-20 DIAGNOSIS — F43.10 PTSD (POST-TRAUMATIC STRESS DISORDER): ICD-10-CM

## 2020-05-21 RX ORDER — ZOLPIDEM TARTRATE 10 MG/1
TABLET ORAL
Qty: 30 TAB | Refills: 1 | Status: SHIPPED | OUTPATIENT
Start: 2020-05-21 | End: 2020-07-20

## 2020-06-11 RX ORDER — QUETIAPINE FUMARATE 50 MG/1
TABLET, EXTENDED RELEASE ORAL
Qty: 30 TAB | Refills: 1 | Status: SHIPPED | OUTPATIENT
Start: 2020-06-11 | End: 2020-08-17

## 2020-06-15 DIAGNOSIS — F43.10 PTSD (POST-TRAUMATIC STRESS DISORDER): ICD-10-CM

## 2020-06-15 DIAGNOSIS — F31.71 BIPOLAR DISORDER, IN PARTIAL REMISSION, MOST RECENT EPISODE HYPOMANIC (HCC): ICD-10-CM

## 2020-06-15 RX ORDER — DULOXETIN HYDROCHLORIDE 30 MG/1
CAPSULE, DELAYED RELEASE ORAL
Qty: 30 CAP | Refills: 2 | Status: SHIPPED | OUTPATIENT
Start: 2020-06-15 | End: 2020-09-25

## 2020-06-24 ENCOUNTER — VIRTUAL VISIT (OUTPATIENT)
Dept: BEHAVIORAL/MENTAL HEALTH CLINIC | Age: 55
End: 2020-06-24

## 2020-06-24 DIAGNOSIS — F43.10 PTSD (POST-TRAUMATIC STRESS DISORDER): ICD-10-CM

## 2020-06-24 DIAGNOSIS — F43.21 COMPLICATED GRIEF: ICD-10-CM

## 2020-06-24 DIAGNOSIS — F31.71 BIPOLAR DISORDER, IN PARTIAL REMISSION, MOST RECENT EPISODE HYPOMANIC (HCC): Primary | ICD-10-CM

## 2020-06-24 RX ORDER — INDOMETHACIN 50 MG/1
50 CAPSULE ORAL 3 TIMES DAILY
COMMUNITY
Start: 2020-06-09 | End: 2020-07-09

## 2020-06-24 RX ORDER — QUETIAPINE 400 MG/1
TABLET, FILM COATED, EXTENDED RELEASE ORAL
Qty: 30 TAB | Refills: 2 | Status: SHIPPED | OUTPATIENT
Start: 2020-06-24 | End: 2020-10-21 | Stop reason: SDUPTHER

## 2020-06-24 RX ORDER — LITHIUM CARBONATE 300 MG/1
CAPSULE ORAL
Qty: 60 CAP | Refills: 2 | Status: SHIPPED | OUTPATIENT
Start: 2020-06-24 | End: 2020-10-21 | Stop reason: SDUPTHER

## 2020-06-24 NOTE — PROGRESS NOTES
Truman Davis is a 48 y.o. male evaluated via audio only technology on 6/24/2020. Consent: He and/or his health care decision maker is aware that he may receive a bill for this audio only encounter, depending on his insurance coverage, and has provided verbal consent to proceed: Yes    I communicated with the patient and/or health care decision maker about the nature and details of the following:  Assessment & Plan:   current symptoms and managament of chronic MH symptoms. Pt discussed recent stressors and he reports they have contributed to exacerbated depression and irritability. At this time he feels like he is managing well. He is working with his therapist on emotional processing and coping strategies. Reviewed current meds and no changes required at this time. Will increase seroquel xr to 500 mg total dose if depression and agitation persist or worsen. Pt reports he has been working on decreasing use of alprazolam. He also states he has tried to decrease [place] but finds he needs it most nights to sleep. Reviewed treatment goals and symptoms to monitor for. 12  Subjective:   Truman Davis is a 48 y.o. male who was seen for Medication Management      Prior to Admission medications    Medication Sig Start Date End Date Taking? Authorizing Provider   indomethacin (INDOCIN) 50 mg capsule Take 50 mg by mouth three (3) times daily.  6/9/20 7/9/20 Yes Provider, Historical   DULoxetine (CYMBALTA) 30 mg capsule TAKE ONE CAPSULE BY MOUTH DAILY 6/15/20   Daniel Moreno NP   QUEtiapine SR (SEROquel XR) 50 mg sr tablet TAKE ONE TABLET BY MOUTH DAILY 6/11/20   Nima Jin MD   zolpidem (AMBIEN) 10 mg tablet TAKE ONE TABLET BY MOUTH EVERY NIGHT AT BEDTIME AS NEEDED FOR SLEEP 5/21/20   Daniel Moreno NP   lithium carbonate 300 mg capsule TAKE TWO CAPSULES BY MOUTH EVERY NIGHT AT BEDTIME 4/1/20   Daniel Moreno NP   ALPRAZolam Desirae Pat) 0.5 mg tablet TAKE ONE TABLET BY MOUTH TWICE A DAY ( DO NOT TAKE WITH OTHER BENZOS, ALCOHOL OR NARCOTICS) 4/2/20   Nitish Moreno NP   QUEtiapine SR (SEROquel XR) 400 mg sr tablet TAKE ONE TABLET BY MOUTH EVERY NIGHT AT BEDTIME 3/12/20   Nitish Moreno NP   topiramate (TOPAMAX) 50 mg tablet Take  by mouth two (2) times a day. Provider, Historical   fluticasone-salmeterol (ADVAIR DISKUS) 100-50 mcg/dose diskus inhaler Take 1 Puff by inhalation every twelve (12) hours. Provider, Historical   loratadine (CLARITIN) 10 mg tablet Take 10 mg by mouth daily. Provider, Historical   fentaNYL (DURAGESIC) 50 mcg/hr PATCH 1 Patch by TransDERmal route every fourty-eight (48) hours. Provider, Historical   pregabalin (LYRICA) 150 mg capsule Take 150 mg by mouth three (3) times daily. Provider, Historical   oxyCODONE IR (OXY-IR) 15 mg immediate release tablet Take 15 mg by mouth three (3) times daily as needed for Pain. Provider, Historical   testosterone cypionate (DEPOTESTOTERONE CYPIONATE) 200 mg/mL injection by IntraMUSCular route every fourteen (14) days. Provider, Historical   levothyroxine (SYNTHROID) 125 mcg tablet Take  by mouth Daily (before breakfast). Provider, Historical   cholecalciferol, vitamin D3, (VITAMIN D3) 2,000 unit tab Take  by mouth. Provider, Historical   metoprolol (LOPRESSOR) 25 mg tablet Take  by mouth daily. Provider, Historical   rosuvastatin (CRESTOR) 40 mg tablet Take 40 mg by mouth nightly. Provider, Historical   SUMAtriptan (IMITREX) 100 mg tablet Take 100 mg by mouth once as needed for Migraine. Provider, Historical   polyethylene glycol (MIRALAX) 17 gram/dose powder Take 17 g by mouth daily as needed. Provider, Historical   nitroglycerin (NITROSTAT) 0.4 mg SL tablet by SubLINGual route every five (5) minutes as needed for Chest Pain. Provider, Historical     Allergies   Allergen Reactions    Tetracycline Swelling         ICD-10-CM ICD-9-CM    1.  Bipolar disorder, in partial remission, most recent episode hypomanic (HCC) F31.71 296.80 lithium carbonate 300 mg capsule      QUEtiapine SR (SEROquel XR) 400 mg sr tablet   2. PTSD (post-traumatic stress disorder) F43.10 309.81 lithium carbonate 300 mg capsule      QUEtiapine SR (SEROquel XR) 400 mg sr tablet   3. Complicated grief Y28.62 309.0        ROS: depression, anxiety, agitation/irritability    I affirm this is a Patient-Initiated Episode with a Patient who has not had a related appointment within my department in the past 7 days or scheduled within the next 24 hours.     Total Time: minutes: 11-20 minutes    Note: not billable if this call serves to triage the patient into an appointment for the relevant concern      Clemente Brower NP

## 2020-07-15 DIAGNOSIS — F31.71 BIPOLAR DISORDER, IN PARTIAL REMISSION, MOST RECENT EPISODE HYPOMANIC (HCC): ICD-10-CM

## 2020-07-18 DIAGNOSIS — F31.71 BIPOLAR DISORDER, IN PARTIAL REMISSION, MOST RECENT EPISODE HYPOMANIC (HCC): ICD-10-CM

## 2020-07-18 DIAGNOSIS — F43.10 PTSD (POST-TRAUMATIC STRESS DISORDER): ICD-10-CM

## 2020-07-20 RX ORDER — ZOLPIDEM TARTRATE 10 MG/1
TABLET ORAL
Qty: 30 TAB | Refills: 0 | Status: SHIPPED | OUTPATIENT
Start: 2020-07-20 | End: 2020-08-17

## 2020-07-20 RX ORDER — ALPRAZOLAM 0.5 MG/1
TABLET ORAL
Qty: 60 TAB | Refills: 0 | Status: SHIPPED | OUTPATIENT
Start: 2020-07-20 | End: 2020-09-08

## 2020-08-16 DIAGNOSIS — F31.71 BIPOLAR DISORDER, IN PARTIAL REMISSION, MOST RECENT EPISODE HYPOMANIC (HCC): ICD-10-CM

## 2020-08-16 DIAGNOSIS — F43.10 PTSD (POST-TRAUMATIC STRESS DISORDER): ICD-10-CM

## 2020-08-17 RX ORDER — ZOLPIDEM TARTRATE 10 MG/1
TABLET ORAL
Qty: 30 TAB | Refills: 0 | Status: SHIPPED | OUTPATIENT
Start: 2020-08-19 | End: 2020-09-17

## 2020-08-17 RX ORDER — QUETIAPINE FUMARATE 50 MG/1
TABLET, EXTENDED RELEASE ORAL
Qty: 30 TAB | Refills: 2 | Status: SHIPPED | OUTPATIENT
Start: 2020-08-17 | End: 2020-10-21 | Stop reason: SDUPTHER

## 2020-09-06 DIAGNOSIS — F31.71 BIPOLAR DISORDER, IN PARTIAL REMISSION, MOST RECENT EPISODE HYPOMANIC (HCC): ICD-10-CM

## 2020-09-09 RX ORDER — ALPRAZOLAM 0.5 MG/1
TABLET ORAL
Qty: 60 TAB | Refills: 0 | Status: SHIPPED | OUTPATIENT
Start: 2020-09-09 | End: 2020-10-26

## 2020-09-14 DIAGNOSIS — F31.71 BIPOLAR DISORDER, IN PARTIAL REMISSION, MOST RECENT EPISODE HYPOMANIC (HCC): ICD-10-CM

## 2020-09-14 DIAGNOSIS — F43.10 PTSD (POST-TRAUMATIC STRESS DISORDER): ICD-10-CM

## 2020-09-14 RX ORDER — ZOLPIDEM TARTRATE 10 MG/1
TABLET ORAL
Qty: 30 TAB | Refills: 0 | OUTPATIENT
Start: 2020-09-14

## 2020-09-17 DIAGNOSIS — F31.71 BIPOLAR DISORDER, IN PARTIAL REMISSION, MOST RECENT EPISODE HYPOMANIC (HCC): ICD-10-CM

## 2020-09-17 DIAGNOSIS — F43.10 PTSD (POST-TRAUMATIC STRESS DISORDER): ICD-10-CM

## 2020-09-17 RX ORDER — ZOLPIDEM TARTRATE 10 MG/1
TABLET ORAL
Qty: 30 TAB | Refills: 2 | Status: SHIPPED | OUTPATIENT
Start: 2020-09-18 | End: 2020-12-14

## 2020-09-25 DIAGNOSIS — F43.10 PTSD (POST-TRAUMATIC STRESS DISORDER): ICD-10-CM

## 2020-09-25 DIAGNOSIS — F31.71 BIPOLAR DISORDER, IN PARTIAL REMISSION, MOST RECENT EPISODE HYPOMANIC (HCC): ICD-10-CM

## 2020-09-28 RX ORDER — DULOXETIN HYDROCHLORIDE 30 MG/1
CAPSULE, DELAYED RELEASE ORAL
Qty: 30 CAP | Refills: 1 | Status: SHIPPED | OUTPATIENT
Start: 2020-09-28 | End: 2020-11-25

## 2020-10-19 DIAGNOSIS — F43.10 PTSD (POST-TRAUMATIC STRESS DISORDER): ICD-10-CM

## 2020-10-19 DIAGNOSIS — F31.71 BIPOLAR DISORDER, IN PARTIAL REMISSION, MOST RECENT EPISODE HYPOMANIC (HCC): ICD-10-CM

## 2020-10-19 RX ORDER — ZOLPIDEM TARTRATE 10 MG/1
TABLET ORAL
Qty: 30 TAB | Refills: 2 | OUTPATIENT
Start: 2020-10-19

## 2020-10-19 NOTE — TELEPHONE ENCOUNTER
Requested Prescriptions     Pending Prescriptions Disp Refills    zolpidem (AMBIEN) 10 mg tablet 30 Tab 2

## 2020-10-21 ENCOUNTER — VIRTUAL VISIT (OUTPATIENT)
Dept: BEHAVIORAL/MENTAL HEALTH CLINIC | Age: 55
End: 2020-10-21
Payer: COMMERCIAL

## 2020-10-21 DIAGNOSIS — F43.10 PTSD (POST-TRAUMATIC STRESS DISORDER): ICD-10-CM

## 2020-10-21 DIAGNOSIS — F43.21 COMPLICATED GRIEF: ICD-10-CM

## 2020-10-21 DIAGNOSIS — F31.71 BIPOLAR DISORDER, IN PARTIAL REMISSION, MOST RECENT EPISODE HYPOMANIC (HCC): Primary | ICD-10-CM

## 2020-10-21 PROCEDURE — 99214 OFFICE O/P EST MOD 30 MIN: CPT | Performed by: NURSE PRACTITIONER

## 2020-10-21 RX ORDER — QUETIAPINE 400 MG/1
TABLET, FILM COATED, EXTENDED RELEASE ORAL
Qty: 30 TAB | Refills: 2 | Status: SHIPPED | OUTPATIENT
Start: 2020-10-21 | End: 2021-01-18

## 2020-10-21 RX ORDER — LITHIUM CARBONATE 300 MG/1
CAPSULE ORAL
Qty: 60 CAP | Refills: 2 | Status: SHIPPED | OUTPATIENT
Start: 2020-10-21 | End: 2021-01-20

## 2020-10-21 RX ORDER — QUETIAPINE FUMARATE 50 MG/1
50 TABLET, EXTENDED RELEASE ORAL
Qty: 30 TAB | Refills: 2 | Status: SHIPPED | OUTPATIENT
Start: 2020-10-21 | End: 2021-02-15

## 2020-10-21 NOTE — PROGRESS NOTES
CHIEF COMPLAINT:  Kristi Ruffin is a 48 y.o. male and was seen today for follow-up of psychiatric condition and psychotropic medication management. HPI:    Dunia Maki reports the following psychiatric symptoms:  depression, agitation, anxiety, anette and hypomania. The symptoms have been present for months and are of moderate/high severity. Pt reports overall he has been feeling better day to day. He continues to have some episodes of anxiety that are moderate severity. Overall pt reports benefit from current medications. Met with pt via video telehealth for appt today. FAMILY/SOCIAL HX: son had major surgery    REVIEW OF SYSTEMS:  Psychiatric:  depression, anxiety, agitation, hypomania/anette  Appetite:good   Sleep: improved overall, using ambien most nights  Neuro: no updates reported      Side Effects:  none    MENTAL STATUS EXAM:   Sensorium  oriented to time, place and person   Relations cooperative   Appearance:  age appropriate and casually dressed   Motor Behavior:  gait stable and within normal limits   Speech:  normal volume   Thought Process: goal directed   Thought Content free of delusions and free of hallucinations   Suicidal ideations no intention   Homicidal ideations no intention   Mood:  anxious and within normal limits   Affect:  anxious and wnl   Memory recent  adequate   Memory remote:  adequate   Concentration:  adequate   Abstraction:  abstract   Insight:  good   Reliability good   Judgment:  good     MEDICAL DECISION MAKING:  Problems addressed today:    ICD-10-CM ICD-9-CM    1. Bipolar disorder, in partial remission, most recent episode hypomanic ([facility]  F31.71 296.80    2. PTSD (post-traumatic stress disorder)  F43.10 309.81    3. Complicated grief  C62.99 309.0        Assessment:   Dunia Maki is responding to treatment overall. Had made several medication adjustments several months ago to reduce polypharmacy. Pt states he now feels better and overall mood has been stabilized.  Reviewed possible med changes in future to continue to reduce polypharmacy. He does have some ongoing episodes pf anxiety. Pt discussed additional strategies he is using for anxiety management. Reinforced the importance tiered approach to anxiety management. Pt states he has had to use approx 1 tab alprazolam 3-4 times per week. He is using as directed and he is aware of risk of benzos. Provided support and reinforcement. Plan:   1. Current Outpatient Medications   Medication Sig Dispense Refill    DULoxetine (CYMBALTA) 30 mg capsule TAKE ONE CAPSULE BY MOUTH DAILY 30 Cap 1    zolpidem (AMBIEN) 10 mg tablet TAKE ONE TABLET BY MOUTH EVERY NIGHT AT BEDTIME AS NEEDED FOR SLEEP 30 Tab 2    ALPRAZolam (XANAX) 0.5 mg tablet TAKE ONE TABLET BY MOUTH TWICE A DAY; DO NOT TAKE WITH OTHER BENZOS, ALCOHOL OR NARCOTICS 60 Tab 0    QUEtiapine SR (SEROquel XR) 50 mg sr tablet TAKE ONE TABLET BY MOUTH DAILY 30 Tab 2    lithium carbonate 300 mg capsule TAKE TWO CAPSULES BY MOUTH EVERY NIGHT AT BEDTIME 60 Cap 2    QUEtiapine SR (SEROquel XR) 400 mg sr tablet TAKE ONE TABLET BY MOUTH EVERY NIGHT AT BEDTIME 30 Tab 2    topiramate (TOPAMAX) 50 mg tablet Take  by mouth two (2) times a day.  fluticasone-salmeterol (ADVAIR DISKUS) 100-50 mcg/dose diskus inhaler Take 1 Puff by inhalation every twelve (12) hours.  loratadine (CLARITIN) 10 mg tablet Take 10 mg by mouth daily.  fentaNYL (DURAGESIC) 50 mcg/hr PATCH 1 Patch by TransDERmal route every fourty-eight (48) hours.  pregabalin (LYRICA) 150 mg capsule Take 150 mg by mouth three (3) times daily.  oxyCODONE IR (OXY-IR) 15 mg immediate release tablet Take 15 mg by mouth three (3) times daily as needed for Pain.  testosterone cypionate (DEPOTESTOTERONE CYPIONATE) 200 mg/mL injection by IntraMUSCular route every fourteen (14) days.  levothyroxine (SYNTHROID) 125 mcg tablet Take  by mouth Daily (before breakfast).       cholecalciferol, vitamin D3, (VITAMIN D3) 2,000 unit tab Take  by mouth.  metoprolol (LOPRESSOR) 25 mg tablet Take  by mouth daily.  rosuvastatin (CRESTOR) 40 mg tablet Take 40 mg by mouth nightly.  SUMAtriptan (IMITREX) 100 mg tablet Take 100 mg by mouth once as needed for Migraine.  polyethylene glycol (MIRALAX) 17 gram/dose powder Take 17 g by mouth daily as needed.  nitroglycerin (NITROSTAT) 0.4 mg SL tablet by SubLINGual route every five (5) minutes as needed for Chest Pain.            medication changes made today: cont seroquel, lithium, cymbalta, ambien and alprazolam    2. Counseling and coordination of care including instructions for treatment, risks/benefits, risk factor reduction and patient/family education. He agrees with the plan. Patient instructed to call with any side effects, questions or issues. 3.    Follow-up and Dispositions    · Return in about 3 months (around 1/21/2021). Micky Dublin, who was evaluated through a synchronous (real-time) audio-video encounter, and/or his healthcare decision maker, is aware that it is a billable service, with coverage as determined by his insurance carrier. He provided verbal consent to proceed: Yes, and patient identification was verified. It was conducted pursuant to the emergency declaration under the [address] [place] [address], [address] [place] [address] authority and the [employer] Act. A caregiver was present when appropriate. Ability to conduct physical exam was limited. I was at home. The patient was at home.       10/21/2020  Estrella Valerio NP

## 2020-10-26 DIAGNOSIS — F31.71 BIPOLAR DISORDER, IN PARTIAL REMISSION, MOST RECENT EPISODE HYPOMANIC (HCC): ICD-10-CM

## 2020-10-26 RX ORDER — ALPRAZOLAM 0.5 MG/1
TABLET ORAL
Qty: 60 TAB | Refills: 0 | Status: SHIPPED | OUTPATIENT
Start: 2020-10-26 | End: 2020-12-02

## 2020-11-25 DIAGNOSIS — F43.10 PTSD (POST-TRAUMATIC STRESS DISORDER): ICD-10-CM

## 2020-11-25 DIAGNOSIS — F31.71 BIPOLAR DISORDER, IN PARTIAL REMISSION, MOST RECENT EPISODE HYPOMANIC (HCC): ICD-10-CM

## 2020-11-25 RX ORDER — DULOXETIN HYDROCHLORIDE 30 MG/1
CAPSULE, DELAYED RELEASE ORAL
Qty: 30 CAP | Refills: 2 | Status: SHIPPED | OUTPATIENT
Start: 2020-11-25 | End: 2021-03-01

## 2020-12-02 DIAGNOSIS — F31.71 BIPOLAR DISORDER, IN PARTIAL REMISSION, MOST RECENT EPISODE HYPOMANIC (HCC): ICD-10-CM

## 2020-12-02 RX ORDER — ALPRAZOLAM 0.5 MG/1
TABLET ORAL
Qty: 60 TAB | Refills: 0 | Status: SHIPPED | OUTPATIENT
Start: 2020-12-02 | End: 2021-02-02

## 2020-12-13 DIAGNOSIS — F43.10 PTSD (POST-TRAUMATIC STRESS DISORDER): ICD-10-CM

## 2020-12-13 DIAGNOSIS — F31.71 BIPOLAR DISORDER, IN PARTIAL REMISSION, MOST RECENT EPISODE HYPOMANIC (HCC): ICD-10-CM

## 2020-12-14 RX ORDER — ZOLPIDEM TARTRATE 10 MG/1
TABLET ORAL
Qty: 30 TAB | Refills: 1 | Status: SHIPPED | OUTPATIENT
Start: 2020-12-14 | End: 2021-02-11

## 2021-01-18 DIAGNOSIS — F43.10 PTSD (POST-TRAUMATIC STRESS DISORDER): ICD-10-CM

## 2021-01-18 DIAGNOSIS — F31.71 BIPOLAR DISORDER, IN PARTIAL REMISSION, MOST RECENT EPISODE HYPOMANIC (HCC): ICD-10-CM

## 2021-01-18 RX ORDER — QUETIAPINE 400 MG/1
TABLET, FILM COATED, EXTENDED RELEASE ORAL
Qty: 30 TAB | Refills: 1 | Status: SHIPPED | OUTPATIENT
Start: 2021-01-18 | End: 2021-03-15

## 2021-01-19 DIAGNOSIS — F31.71 BIPOLAR DISORDER, IN PARTIAL REMISSION, MOST RECENT EPISODE HYPOMANIC (HCC): ICD-10-CM

## 2021-01-19 DIAGNOSIS — F43.10 PTSD (POST-TRAUMATIC STRESS DISORDER): ICD-10-CM

## 2021-01-20 RX ORDER — LITHIUM CARBONATE 300 MG/1
CAPSULE ORAL
Qty: 60 CAP | Refills: 0 | Status: SHIPPED | OUTPATIENT
Start: 2021-01-20 | End: 2021-03-18 | Stop reason: SDUPTHER

## 2021-02-02 DIAGNOSIS — F31.71 BIPOLAR DISORDER, IN PARTIAL REMISSION, MOST RECENT EPISODE HYPOMANIC (HCC): ICD-10-CM

## 2021-02-02 RX ORDER — ALPRAZOLAM 0.5 MG/1
TABLET ORAL
Qty: 60 TAB | Refills: 0 | Status: SHIPPED | OUTPATIENT
Start: 2021-02-02 | End: 2021-03-15

## 2021-02-10 DIAGNOSIS — F31.71 BIPOLAR DISORDER, IN PARTIAL REMISSION, MOST RECENT EPISODE HYPOMANIC (HCC): ICD-10-CM

## 2021-02-10 DIAGNOSIS — F43.10 PTSD (POST-TRAUMATIC STRESS DISORDER): ICD-10-CM

## 2021-02-11 RX ORDER — ZOLPIDEM TARTRATE 10 MG/1
10 TABLET ORAL
Qty: 30 TAB | Refills: 0 | Status: SHIPPED | OUTPATIENT
Start: 2021-02-11 | End: 2021-03-18 | Stop reason: SDUPTHER

## 2021-02-11 NOTE — TELEPHONE ENCOUNTER
Requested Prescriptions     Pending Prescriptions Disp Refills    zolpidem (AMBIEN) 10 mg tablet [Pharmacy Med Name: ZOLPIDEM TARTRATE 10 MG TABLET] 30 Tab 0     Sig: TAKE ONE TABLET BY MOUTH EVERY NIGHT AT BEDTIME AS NEEDED FOR SLEEP     Last visit: 10.21.20  Next visit: Due Jan 2021. He will call back to scheduled. While we were talking to him  I could hear an IV pump going off in the back ground. I asked him if he was at a hospital. He said he was at an ED in [place]. They may do emergency surgery on his neck.      :  02/02/2021 1 02/02/2021 Alprazolam 0.5 Mg Tablet   60.00 30 Pa All [id number] [facility] (9740) 0 2.00 [facility]     01/16/2021 1 01/15/2021 Fentanyl 25 Mcg/hr Patch   10.00 30 Tu Gaj [id number] [facility] (9740) 0 60.00 [facility]     01/16/2021 1 01/15/2021 Oxycodone Hcl 15 Mg Tablet   120.00 30 Tu Gaj [id number] [facility] (9740) 0 90.00 [facility]     01/12/2021 1 12/14/2020 Zolpidem Tartrate 10 Mg Tablet   30.00 30 Pa All [id number] [facility] (9740) 1 0.50 [facility]

## 2021-02-13 DIAGNOSIS — F31.71 BIPOLAR DISORDER, IN PARTIAL REMISSION, MOST RECENT EPISODE HYPOMANIC (HCC): ICD-10-CM

## 2021-02-15 RX ORDER — QUETIAPINE FUMARATE 50 MG/1
TABLET, EXTENDED RELEASE ORAL
Qty: 30 TAB | Refills: 1 | Status: SHIPPED | OUTPATIENT
Start: 2021-02-15 | End: 2021-03-18 | Stop reason: DRUGHIGH

## 2021-02-27 DIAGNOSIS — F31.71 BIPOLAR DISORDER, IN PARTIAL REMISSION, MOST RECENT EPISODE HYPOMANIC (HCC): ICD-10-CM

## 2021-02-27 DIAGNOSIS — F43.10 PTSD (POST-TRAUMATIC STRESS DISORDER): ICD-10-CM

## 2021-03-01 RX ORDER — DULOXETIN HYDROCHLORIDE 30 MG/1
CAPSULE, DELAYED RELEASE ORAL
Qty: 30 CAP | Refills: 1 | Status: SHIPPED | OUTPATIENT
Start: 2021-03-01 | End: 2021-04-28

## 2021-03-15 DIAGNOSIS — F43.10 PTSD (POST-TRAUMATIC STRESS DISORDER): ICD-10-CM

## 2021-03-15 DIAGNOSIS — F31.71 BIPOLAR DISORDER, IN PARTIAL REMISSION, MOST RECENT EPISODE HYPOMANIC (HCC): ICD-10-CM

## 2021-03-15 RX ORDER — ALPRAZOLAM 0.5 MG/1
TABLET ORAL
Qty: 60 TAB | Refills: 0 | Status: SHIPPED | OUTPATIENT
Start: 2021-03-15 | End: 2021-05-03

## 2021-03-15 RX ORDER — QUETIAPINE 400 MG/1
TABLET, FILM COATED, EXTENDED RELEASE ORAL
Qty: 30 TAB | Refills: 0 | Status: SHIPPED | OUTPATIENT
Start: 2021-03-15 | End: 2021-03-18 | Stop reason: DRUGHIGH

## 2021-03-18 ENCOUNTER — VIRTUAL VISIT (OUTPATIENT)
Dept: BEHAVIORAL/MENTAL HEALTH CLINIC | Age: 56
End: 2021-03-18
Payer: COMMERCIAL

## 2021-03-18 DIAGNOSIS — F43.10 PTSD (POST-TRAUMATIC STRESS DISORDER): ICD-10-CM

## 2021-03-18 DIAGNOSIS — F31.32 BIPOLAR AFFECTIVE DISORDER, CURRENTLY DEPRESSED, MODERATE (HCC): Primary | ICD-10-CM

## 2021-03-18 DIAGNOSIS — F31.71 BIPOLAR DISORDER, IN PARTIAL REMISSION, MOST RECENT EPISODE HYPOMANIC (HCC): ICD-10-CM

## 2021-03-18 PROCEDURE — G9717 DOC PT DX DEP/BP F/U NT REQ: HCPCS | Performed by: NURSE PRACTITIONER

## 2021-03-18 PROCEDURE — 3017F COLORECTAL CA SCREEN DOC REV: CPT | Performed by: NURSE PRACTITIONER

## 2021-03-18 PROCEDURE — G8421 BMI NOT CALCULATED: HCPCS | Performed by: NURSE PRACTITIONER

## 2021-03-18 PROCEDURE — G8427 DOCREV CUR MEDS BY ELIG CLIN: HCPCS | Performed by: NURSE PRACTITIONER

## 2021-03-18 PROCEDURE — 99214 OFFICE O/P EST MOD 30 MIN: CPT | Performed by: NURSE PRACTITIONER

## 2021-03-18 RX ORDER — ZOLPIDEM TARTRATE 10 MG/1
10 TABLET ORAL
Qty: 30 TAB | Refills: 0 | Status: SHIPPED | OUTPATIENT
Start: 2021-03-18 | End: 2021-04-16 | Stop reason: SDUPTHER

## 2021-03-18 RX ORDER — LITHIUM CARBONATE 300 MG/1
600 CAPSULE ORAL
Qty: 60 CAP | Refills: 2 | Status: SHIPPED | OUTPATIENT
Start: 2021-03-18 | End: 2021-07-26

## 2021-03-18 RX ORDER — QUETIAPINE 300 MG/1
600 TABLET, FILM COATED, EXTENDED RELEASE ORAL
Qty: 60 TAB | Refills: 2 | Status: SHIPPED | OUTPATIENT
Start: 2021-03-18 | End: 2021-06-16

## 2021-03-18 NOTE — PROGRESS NOTES
CHIEF COMPLAINT:  Beckie Goel is a 55 y.o. male and was seen today for follow-up of psychiatric condition and psychotropic medication management. HPI:    Tomas Lopez reports the following psychiatric symptoms by hx: agitation, hypomania/anette, depression and anxiety. Overall depression and anxiety symptoms have been present for several weeks. Currently depression is of moderate/high severity. Anxiety was moderate high but seems moderate severity currently. The symptoms occur daily. Pt reports medications are somewhat helpful. Met with pt via video telehealth for appt today. FAMILY/SOCIAL HX: increased psychosocial stressors    REVIEW OF SYSTEMS:  Psychiatric:  depression, anxiety, agitation, hypomania/anette  Appetite:good/fair   Sleep: poor with DIMS (difficulty initiating & maintaining sleep)   Neuro: denies updates      Side Effects:  none    MENTAL STATUS EXAM:   Sensorium  oriented to time, place and person   Relations cooperative   Appearance:  age appropriate and casually dressed   Motor Behavior:  gait stable and within normal limits   Speech:  normal volume   Thought Process: goal directed   Thought Content free of delusions and free of hallucinations   Suicidal ideations no intention   Homicidal ideations no intention   Mood:  depressed   Affect:  depressed   Memory recent  adequate   Memory remote:  adequate   Concentration:  adequate   Abstraction:  abstract   Insight:  fair and good   Reliability good and fair   Judgment:  fair and good     MEDICAL DECISION MAKING:  Problems addressed today:    ICD-10-CM ICD-9-CM    1. Bipolar affective disorder, currently depressed, moderate ([facility]  F31.32 296.52 LITHIUM   2. PTSD (post-traumatic stress disorder)  F43.10 309.81 QUEtiapine SR (SEROquel XR) 300 mg sr tablet      zolpidem (AMBIEN) 10 mg tablet      lithium carbonate 300 mg capsule   3.  Bipolar disorder, in partial remission, most recent episode hypomanic ([facility]  F31.71 296.80 QUEtiapine SR (SEROquel XR) 300 mg sr tablet      zolpidem (AMBIEN) 10 mg tablet      lithium carbonate 300 mg capsule       Assessment:   Mio Caballero is responding to treatment somewhat. Currently symptoms are exacerbated. Discussed current medications and dosages. Will increase seroquel to 600 mg. May also increase cymbalta but will first assess response. Will also check a lithium level. Pt using ambien most every night. He reports he was using alprazolam bid but now uses it daily most days. Pt is aware of benzo safety evelyne with use of opioid narcotic from another provider. Reviewed treatment goals and target symptoms to monitor for. Reviewed importance of ind therapy. Will send pt a resource list.     Plan:   1. Current Outpatient Medications   Medication Sig Dispense Refill    QUEtiapine SR (SEROquel XR) 300 mg sr tablet Take 2 Tabs by mouth nightly. 60 Tab 2    zolpidem (AMBIEN) 10 mg tablet Take 1 Tab by mouth nightly as needed for Sleep. Max Daily Amount: 10 mg. Do not take with other benzo's, narcotics or alcohol. 30 Tab 0    lithium carbonate 300 mg capsule Take 2 Caps by mouth nightly. 60 Cap 2    ALPRAZolam (XANAX) 0.5 mg tablet TAKE ONE TABLET BY MOUTH TWICE A DAY (DO NOT TAKE WITH OTHER BENZOS, ALCOHOL, OR NARCOTICS) 60 Tab 0    DULoxetine (CYMBALTA) 30 mg capsule TAKE ONE CAPSULE BY MOUTH DAILY 30 Cap 1    fluticasone-salmeterol (ADVAIR DISKUS) 100-50 mcg/dose diskus inhaler Take 1 Puff by inhalation every twelve (12) hours.  loratadine (CLARITIN) 10 mg tablet Take 10 mg by mouth daily.  fentaNYL (DURAGESIC) 50 mcg/hr PATCH 1 Patch by TransDERmal route every fourty-eight (48) hours.  pregabalin (LYRICA) 150 mg capsule Take 150 mg by mouth three (3) times daily.  oxyCODONE IR (OXY-IR) 15 mg immediate release tablet Take 15 mg by mouth three (3) times daily as needed for Pain.  testosterone cypionate (DEPOTESTOTERONE CYPIONATE) 200 mg/mL injection by IntraMUSCular route every fourteen (14) days.  levothyroxine (SYNTHROID) 125 mcg tablet Take  by mouth Daily (before breakfast).  cholecalciferol, vitamin D3, (VITAMIN D3) 2,000 unit tab Take  by mouth.  metoprolol (LOPRESSOR) 25 mg tablet Take  by mouth daily.  rosuvastatin (CRESTOR) 40 mg tablet Take 40 mg by mouth nightly.  SUMAtriptan (IMITREX) 100 mg tablet Take 100 mg by mouth once as needed for Migraine.  polyethylene glycol (MIRALAX) 17 gram/dose powder Take 17 g by mouth daily as needed.  nitroglycerin (NITROSTAT) 0.4 mg SL tablet by SubLINGual route every five (5) minutes as needed for Chest Pain.            medication changes made today: increase seroquel 600 mg, cont cymbalta and lithium, cont prn ambien and alprazolam    2. Counseling and coordination of care including instructions for treatment, risks/benefits, risk factor reduction and patient/family education. He agrees with the plan. Patient instructed to call with any side effects, questions or issues. 3.    Follow-up and Dispositions    · Return in about 2 months (around 5/18/2021). 4. Lithium level     Truman Davis, who was evaluated through a synchronous (real-time) audio-video encounter, and/or his healthcare decision maker, is aware that it is a billable service, with coverage as determined by his insurance carrier. He provided verbal consent to proceed: Yes, and patient identification was verified. This visit was conducted pursuant to the emergency declaration under the [facility], [address] [place] [address] authority and the [employer] and [employer] Act. A caregiver was present when appropriate. Ability to conduct physical exam was limited. The patient was located in a state where the provider was credentialed to provide care.          3/18/2021  Maria Luz Manzo NP

## 2021-04-16 ENCOUNTER — TELEPHONE (OUTPATIENT)
Dept: BEHAVIORAL/MENTAL HEALTH CLINIC | Age: 56
End: 2021-04-16

## 2021-04-16 DIAGNOSIS — F43.10 PTSD (POST-TRAUMATIC STRESS DISORDER): ICD-10-CM

## 2021-04-16 DIAGNOSIS — F31.71 BIPOLAR DISORDER, IN PARTIAL REMISSION, MOST RECENT EPISODE HYPOMANIC (HCC): ICD-10-CM

## 2021-04-16 NOTE — TELEPHONE ENCOUNTER
Last visit: 03.18.21  Next visit: 05.20.21      :  03/18/2021 1 03/18/2021 Zolpidem Tartrate 10 Mg Tablet 30.00 30 Pa All [id number] [facility] (9740) 0 0.50 [facility]    02/11/2021 1 02/11/2021 Zolpidem Tartrate 10 Mg Tablet 30.00 30 Pa All [id number] [facility] (9740) 0 0.50 [facility] [place]    01/12/2021 1 12/14/2020 Zolpidem Tartrate 10 Mg Tablet 30.00 30 Pa All [id number] [facility] (9740) 1 0.50 [facility] [place]

## 2021-04-16 NOTE — TELEPHONE ENCOUNTER
Pt calls asking for refill of his Ambien 10mg   According to    LRD was :    3/18/21  #30  2/11/21  #30     Last OV was 3/18/21  Next OV  Is  5/20/21

## 2021-04-18 RX ORDER — ZOLPIDEM TARTRATE 10 MG/1
10 TABLET ORAL
Qty: 30 TAB | Refills: 0 | Status: SHIPPED | OUTPATIENT
Start: 2021-04-18 | End: 2021-05-20 | Stop reason: SDUPTHER

## 2021-04-19 ENCOUNTER — TELEPHONE (OUTPATIENT)
Dept: BEHAVIORAL/MENTAL HEALTH CLINIC | Age: 56
End: 2021-04-19

## 2021-04-24 LAB — LITHIUM SERPL-SCNC: 0.7 MMOL/L (ref 0.6–1.2)

## 2021-04-28 DIAGNOSIS — F43.10 PTSD (POST-TRAUMATIC STRESS DISORDER): ICD-10-CM

## 2021-04-28 DIAGNOSIS — F31.71 BIPOLAR DISORDER, IN PARTIAL REMISSION, MOST RECENT EPISODE HYPOMANIC (HCC): ICD-10-CM

## 2021-04-28 RX ORDER — DULOXETIN HYDROCHLORIDE 30 MG/1
CAPSULE, DELAYED RELEASE ORAL
Qty: 30 CAP | Refills: 0 | Status: SHIPPED | OUTPATIENT
Start: 2021-04-28 | End: 2021-05-20 | Stop reason: SDUPTHER

## 2021-04-30 DIAGNOSIS — F31.71 BIPOLAR DISORDER, IN PARTIAL REMISSION, MOST RECENT EPISODE HYPOMANIC (HCC): ICD-10-CM

## 2021-04-30 NOTE — TELEPHONE ENCOUNTER
Donald FLORES to fill.     Kristel Garcia RN    Last OV:  3/18  Next OV:  5/20    :    03/15/2021 1 03/15/2021 Alprazolam 0.5 Mg Tablet   60.00 30 Pa All [id number] [facility] ([id number]) 0 2.00 [facility]

## 2021-05-03 RX ORDER — ALPRAZOLAM 0.5 MG/1
TABLET ORAL
Qty: 60 TAB | Refills: 0 | Status: SHIPPED | OUTPATIENT
Start: 2021-05-03 | End: 2021-06-10

## 2021-05-20 ENCOUNTER — VIRTUAL VISIT (OUTPATIENT)
Dept: BEHAVIORAL/MENTAL HEALTH CLINIC | Age: 56
End: 2021-05-20
Payer: COMMERCIAL

## 2021-05-20 DIAGNOSIS — F43.10 PTSD (POST-TRAUMATIC STRESS DISORDER): ICD-10-CM

## 2021-05-20 DIAGNOSIS — F31.71 BIPOLAR DISORDER, IN PARTIAL REMISSION, MOST RECENT EPISODE HYPOMANIC (HCC): ICD-10-CM

## 2021-05-20 DIAGNOSIS — F31.32 BIPOLAR AFFECTIVE DISORDER, CURRENTLY DEPRESSED, MODERATE (HCC): Primary | ICD-10-CM

## 2021-05-20 PROCEDURE — 3017F COLORECTAL CA SCREEN DOC REV: CPT | Performed by: NURSE PRACTITIONER

## 2021-05-20 PROCEDURE — G9717 DOC PT DX DEP/BP F/U NT REQ: HCPCS | Performed by: NURSE PRACTITIONER

## 2021-05-20 PROCEDURE — G8427 DOCREV CUR MEDS BY ELIG CLIN: HCPCS | Performed by: NURSE PRACTITIONER

## 2021-05-20 PROCEDURE — 99214 OFFICE O/P EST MOD 30 MIN: CPT | Performed by: NURSE PRACTITIONER

## 2021-05-20 RX ORDER — ZOLPIDEM TARTRATE 10 MG/1
10 TABLET ORAL
Qty: 30 TABLET | Refills: 0 | Status: SHIPPED | OUTPATIENT
Start: 2021-05-20 | End: 2021-06-21

## 2021-05-20 RX ORDER — DULOXETIN HYDROCHLORIDE 30 MG/1
30 CAPSULE, DELAYED RELEASE ORAL DAILY
Qty: 30 CAPSULE | Refills: 2 | Status: SHIPPED | OUTPATIENT
Start: 2021-05-20 | End: 2021-08-20

## 2021-05-20 NOTE — PROGRESS NOTES
CHIEF COMPLAINT:  Romelia Little is a 55 y.o. male and was seen today for follow-up of psychiatric condition and psychotropic medication management. HPI:    Gema Felton reports the following psychiatric symptoms by hx:  depression, agitation, anxiety and hypomania. Overall symptoms have been present for months. Currently depression is of moderate/high severity. The symptoms occur daily. Pt also reports increased anxiety. Pt reports medications are beneficial overall. Met with pt via video telehealth for appt today. FAMILY/SOCIAL HX: psychosocial stressors    REVIEW OF SYSTEMS:  Psychiatric:  depression, anxiety, agitation, hypomania/anette  Appetite:good   Sleep: improved, uses ambien most nights   Neuro: no updates      Side Effects:  none    MENTAL STATUS EXAM:   Sensorium  oriented to time, place and person   Relations cooperative   Appearance:  age appropriate and casually dressed   Motor Behavior:  gait stable and within normal limits   Speech:  normal volume   Thought Process: goal directed   Thought Content free of delusions and free of hallucinations   Suicidal ideations no intention   Homicidal ideations no intention   Mood:  Depressed, anxious   Affect:  anxious and depressed   Memory recent  adequate   Memory remote:  adequate   Concentration:  adequate   Abstraction:  abstract   Insight:  fair and good   Reliability good and fair   Judgment:  fair and good     MEDICAL DECISION MAKING:  Problems addressed today:    ICD-10-CM ICD-9-CM    1. Bipolar affective disorder, currently depressed, moderate ([facility]  F31.32 296.52    2. PTSD (post-traumatic stress disorder)  F43.10 309.81        Assessment:   Gema Felton is responding to treatment overall. Depression symptoms remain mod/high severity but pt states he is no longer experiencing SI since increasing seroquel. He has been experiencing some sedation and does not want to increase dosages at this time. Discussed current medications and dosages.  No changes made today. Pt is using benzo's as directed. Discussed challenges/limitations of using prn medication to manage such chronic symptoms. Discussed re-starting ind therapy. Pt will call his therapist to discuss. Reviewed treatment goals and target symptoms to monitor for. Plan:   1. Current Outpatient Medications   Medication Sig Dispense Refill    ALPRAZolam (XANAX) 0.5 mg tablet TAKE ONE TABLET BY MOUTH TWICE A DAY (DO NOT TAKE WITH OTHER BENZOS, ALCOHOL OR NARCOTICS) 60 Tab 0    DULoxetine (CYMBALTA) 30 mg capsule TAKE ONE CAPSULE BY MOUTH DAILY 30 Cap 0    zolpidem (AMBIEN) 10 mg tablet Take 1 Tab by mouth nightly as needed for Sleep. Max Daily Amount: 10 mg. Do not take with other benzo's, narcotics or alcohol. 30 Tab 0    QUEtiapine SR (SEROquel XR) 300 mg sr tablet Take 2 Tabs by mouth nightly. 60 Tab 2    lithium carbonate 300 mg capsule Take 2 Caps by mouth nightly. 60 Cap 2    fluticasone-salmeterol (ADVAIR DISKUS) 100-50 mcg/dose diskus inhaler Take 1 Puff by inhalation every twelve (12) hours.  loratadine (CLARITIN) 10 mg tablet Take 10 mg by mouth daily.  fentaNYL (DURAGESIC) 50 mcg/hr PATCH 1 Patch by TransDERmal route every fourty-eight (48) hours.  pregabalin (LYRICA) 150 mg capsule Take 150 mg by mouth three (3) times daily.  oxyCODONE IR (OXY-IR) 15 mg immediate release tablet Take 15 mg by mouth three (3) times daily as needed for Pain.  testosterone cypionate (DEPOTESTOTERONE CYPIONATE) 200 mg/mL injection by IntraMUSCular route every fourteen (14) days.  levothyroxine (SYNTHROID) 125 mcg tablet Take  by mouth Daily (before breakfast).  cholecalciferol, vitamin D3, (VITAMIN D3) 2,000 unit tab Take  by mouth.  metoprolol (LOPRESSOR) 25 mg tablet Take  by mouth daily.  rosuvastatin (CRESTOR) 40 mg tablet Take 40 mg by mouth nightly.  SUMAtriptan (IMITREX) 100 mg tablet Take 100 mg by mouth once as needed for Migraine.       polyethylene glycol (MIRALAX) 17 gram/dose powder Take 17 g by mouth daily as needed.  nitroglycerin (NITROSTAT) 0.4 mg SL tablet by SubLINGual route every five (5) minutes as needed for Chest Pain.            medication changes made today: cont lithium, cymbalta, seroquel and prn ambien and alprazolam    2. Counseling and coordination of care including instructions for treatment, risks/benefits, risk factor reduction and patient/family education. He agrees with the plan. Patient instructed to call with any side effects, questions or issues. 3.    Follow-up and Dispositions    · Return in about 3 months (around 8/20/2021). Jorge Castle, who was evaluated through a synchronous (real-time) audio-video encounter, and/or his healthcare decision maker, is aware that it is a billable service, with coverage as determined by his insurance carrier. He provided verbal consent to proceed: Yes, and patient identification was verified. This visit was conducted pursuant to the emergency declaration under the [facility], [address] [place] [address] authority and the [employer] and [employer] Act. A caregiver was present when appropriate. Ability to conduct physical exam was limited. The patient was located in a state where the provider was credentialed to provide care.        5/20/2021  Ana Fernandez NP

## 2021-06-08 DIAGNOSIS — F31.71 BIPOLAR DISORDER, IN PARTIAL REMISSION, MOST RECENT EPISODE HYPOMANIC (HCC): ICD-10-CM

## 2021-06-10 RX ORDER — ALPRAZOLAM 0.5 MG/1
TABLET ORAL
Qty: 60 TABLET | Refills: 0 | Status: SHIPPED | OUTPATIENT
Start: 2021-06-10 | End: 2021-07-19 | Stop reason: SDUPTHER

## 2021-06-16 DIAGNOSIS — F43.10 PTSD (POST-TRAUMATIC STRESS DISORDER): ICD-10-CM

## 2021-06-16 DIAGNOSIS — F31.71 BIPOLAR DISORDER, IN PARTIAL REMISSION, MOST RECENT EPISODE HYPOMANIC (HCC): ICD-10-CM

## 2021-06-16 RX ORDER — QUETIAPINE 300 MG/1
TABLET, FILM COATED, EXTENDED RELEASE ORAL
Qty: 60 TABLET | Refills: 2 | Status: SHIPPED | OUTPATIENT
Start: 2021-06-16 | End: 2021-09-14

## 2021-06-17 DIAGNOSIS — F31.71 BIPOLAR DISORDER, IN PARTIAL REMISSION, MOST RECENT EPISODE HYPOMANIC (HCC): ICD-10-CM

## 2021-06-17 DIAGNOSIS — F43.10 PTSD (POST-TRAUMATIC STRESS DISORDER): ICD-10-CM

## 2021-06-21 RX ORDER — ZOLPIDEM TARTRATE 10 MG/1
TABLET ORAL
Qty: 30 TABLET | Refills: 0 | Status: SHIPPED | OUTPATIENT
Start: 2021-06-21 | End: 2021-07-19

## 2021-07-17 DIAGNOSIS — F31.71 BIPOLAR DISORDER, IN PARTIAL REMISSION, MOST RECENT EPISODE HYPOMANIC (HCC): ICD-10-CM

## 2021-07-17 DIAGNOSIS — F43.10 PTSD (POST-TRAUMATIC STRESS DISORDER): ICD-10-CM

## 2021-07-19 DIAGNOSIS — F31.71 BIPOLAR DISORDER, IN PARTIAL REMISSION, MOST RECENT EPISODE HYPOMANIC (HCC): ICD-10-CM

## 2021-07-19 RX ORDER — ALPRAZOLAM 0.5 MG/1
TABLET ORAL
Qty: 60 TABLET | Refills: 0 | Status: SHIPPED | OUTPATIENT
Start: 2021-07-19 | End: 2021-08-31

## 2021-07-19 RX ORDER — ZOLPIDEM TARTRATE 10 MG/1
TABLET ORAL
Qty: 30 TABLET | Refills: 0 | Status: SHIPPED | OUTPATIENT
Start: 2021-07-19 | End: 2021-08-18

## 2021-07-23 DIAGNOSIS — F31.71 BIPOLAR DISORDER, IN PARTIAL REMISSION, MOST RECENT EPISODE HYPOMANIC (HCC): ICD-10-CM

## 2021-07-23 DIAGNOSIS — F43.10 PTSD (POST-TRAUMATIC STRESS DISORDER): ICD-10-CM

## 2021-07-26 RX ORDER — LITHIUM CARBONATE 300 MG/1
CAPSULE ORAL
Qty: 60 CAPSULE | Refills: 0 | Status: SHIPPED | OUTPATIENT
Start: 2021-07-26 | End: 2021-08-23

## 2021-08-17 DIAGNOSIS — F43.10 PTSD (POST-TRAUMATIC STRESS DISORDER): ICD-10-CM

## 2021-08-17 DIAGNOSIS — F31.71 BIPOLAR DISORDER, IN PARTIAL REMISSION, MOST RECENT EPISODE HYPOMANIC (HCC): ICD-10-CM

## 2021-08-18 RX ORDER — ZOLPIDEM TARTRATE 10 MG/1
TABLET ORAL
Qty: 30 TABLET | Refills: 0 | Status: SHIPPED | OUTPATIENT
Start: 2021-08-18 | End: 2021-09-16

## 2021-08-18 NOTE — TELEPHONE ENCOUNTER
Sami Clink-    :    08/06/2021  1  08/06/2021  Oxycodone Hcl 15 Mg Tablet   120.00  30  Tu Gaj  [id number]  [facility] (9740)  0  90.00 [facility]  08/06/2021  1  08/06/2021  Fentanyl 25 Mcg/hr Patch   10.00  30  Tu Gaj  [id number]  [facility] (9740)  0  60.00 [facility]  07/30/2021  1  07/27/2021  Testosterone Cyp 200 Mg/ml   2.00  28  Ma Fen  [id number]  [facility] (9740)  0   [facility]  07/20/2021  1  07/20/2021  Pregabalin 150 Mg Capsule   90.00  30  Tu Gaj  [id number]  [facility] (9740)  0  3.01 LME  [facility]  07/19/2021  1  07/19/2021  Alprazolam 0.5 Mg Tablet   60.00  30  Pa All  [id number]  [facility] (9740)  0  2.00 L[facility]  [place]  07/19/2021  1  07/19/2021  Zolpidem Tartrate 10 Mg Tablet   30.00  30  Pa All  [id number]  [facility] (9740)  0  0.50 L[place]  [facility]    OK to fill pending review. None of this looks new per se. All ongoing meds for some time. Just FYI.       Thanks-    Filippo Cespedes RN

## 2021-08-20 DIAGNOSIS — F31.71 BIPOLAR DISORDER, IN PARTIAL REMISSION, MOST RECENT EPISODE HYPOMANIC (HCC): ICD-10-CM

## 2021-08-20 DIAGNOSIS — F43.10 PTSD (POST-TRAUMATIC STRESS DISORDER): ICD-10-CM

## 2021-08-20 RX ORDER — DULOXETIN HYDROCHLORIDE 30 MG/1
30 CAPSULE, DELAYED RELEASE ORAL DAILY
Qty: 90 CAPSULE | Refills: 0 | Status: SHIPPED | OUTPATIENT
Start: 2021-08-20 | End: 2021-11-22

## 2021-08-22 DIAGNOSIS — F31.71 BIPOLAR DISORDER, IN PARTIAL REMISSION, MOST RECENT EPISODE HYPOMANIC (HCC): ICD-10-CM

## 2021-08-22 DIAGNOSIS — F43.10 PTSD (POST-TRAUMATIC STRESS DISORDER): ICD-10-CM

## 2021-08-23 RX ORDER — LITHIUM CARBONATE 300 MG/1
CAPSULE ORAL
Qty: 60 CAPSULE | Refills: 0 | Status: SHIPPED | OUTPATIENT
Start: 2021-08-23 | End: 2021-09-21

## 2021-08-31 DIAGNOSIS — F31.71 BIPOLAR DISORDER, IN PARTIAL REMISSION, MOST RECENT EPISODE HYPOMANIC (HCC): ICD-10-CM

## 2021-08-31 RX ORDER — ALPRAZOLAM 0.5 MG/1
TABLET ORAL
Qty: 60 TABLET | Refills: 0 | Status: SHIPPED | OUTPATIENT
Start: 2021-08-31 | End: 2021-10-04

## 2021-09-14 DIAGNOSIS — F31.71 BIPOLAR DISORDER, IN PARTIAL REMISSION, MOST RECENT EPISODE HYPOMANIC (HCC): ICD-10-CM

## 2021-09-14 DIAGNOSIS — F43.10 PTSD (POST-TRAUMATIC STRESS DISORDER): ICD-10-CM

## 2021-09-14 RX ORDER — QUETIAPINE 300 MG/1
TABLET, FILM COATED, EXTENDED RELEASE ORAL
Qty: 60 TABLET | Refills: 2 | Status: SHIPPED | OUTPATIENT
Start: 2021-09-14 | End: 2021-12-10

## 2021-09-16 RX ORDER — ZOLPIDEM TARTRATE 10 MG/1
TABLET ORAL
Qty: 30 TABLET | Refills: 0 | Status: SHIPPED | OUTPATIENT
Start: 2021-09-16 | End: 2021-10-18

## 2021-09-21 DIAGNOSIS — F31.71 BIPOLAR DISORDER, IN PARTIAL REMISSION, MOST RECENT EPISODE HYPOMANIC (HCC): ICD-10-CM

## 2021-09-21 DIAGNOSIS — F43.10 PTSD (POST-TRAUMATIC STRESS DISORDER): ICD-10-CM

## 2021-09-21 RX ORDER — LITHIUM CARBONATE 300 MG/1
CAPSULE ORAL
Qty: 60 CAPSULE | Refills: 2 | Status: SHIPPED | OUTPATIENT
Start: 2021-09-21 | End: 2021-12-20

## 2021-09-23 ENCOUNTER — VIRTUAL VISIT (OUTPATIENT)
Dept: BEHAVIORAL/MENTAL HEALTH CLINIC | Age: 56
End: 2021-09-23
Payer: COMMERCIAL

## 2021-09-23 ENCOUNTER — DOCUMENTATION ONLY (OUTPATIENT)
Dept: BEHAVIORAL/MENTAL HEALTH CLINIC | Age: 56
End: 2021-09-23

## 2021-09-23 DIAGNOSIS — F31.32 BIPOLAR AFFECTIVE DISORDER, CURRENTLY DEPRESSED, MODERATE (HCC): Primary | ICD-10-CM

## 2021-09-23 DIAGNOSIS — F43.10 PTSD (POST-TRAUMATIC STRESS DISORDER): ICD-10-CM

## 2021-09-23 DIAGNOSIS — F43.21 COMPLICATED GRIEF: ICD-10-CM

## 2021-09-23 PROCEDURE — 99214 OFFICE O/P EST MOD 30 MIN: CPT | Performed by: NURSE PRACTITIONER

## 2021-09-23 NOTE — PROGRESS NOTES
CHIEF COMPLAINT:  Tomás Head is a 55 y.o. male and was seen today for follow-up of psychiatric condition and psychotropic medication management. HPI:    Fei Farfan reports the following psychiatric symptoms by hx:  depression, agitation, anxiety, anette and hypomania. Overall symptoms have been present for years. Currently depression and anxiety symptoms are of moderate/high severity. The symptoms occur more frequently and severely with current stressors. Pt reports medications are beneficial. Met with pt via video telehealth for appt today to review current treatment plan. FAMILY/SOCIAL HX: death of brother    REVIEW OF SYSTEMS:  Psychiatric symptoms being monitored for:  depression, anxiety, agitation, hypomania/anette  Appetite:good   Sleep: improved overall, using ambien most nights  Neuro: upcoming ENT surgery    There were no vitals taken for this visit.: virtual    Side Effects:  none    MENTAL STATUS EXAM:   Sensorium  oriented to time, place and person   Relations cooperative   Appearance:  age appropriate and casually dressed   Motor Behavior:  gait stable and within normal limits   Speech:  normal volume   Thought Process: goal directed   Thought Content free of delusions and free of hallucinations   Suicidal ideations no intention   Homicidal ideations no intention   Mood:  anxious and depressed   Affect:  anxious and depressed   Memory recent  adequate   Memory remote:  adequate   Concentration:  adequate   Abstraction:  abstract   Insight:  good   Reliability good   Judgment:  good     MEDICAL DECISION MAKING:  Problems addressed today:    ICD-10-CM ICD-9-CM    1. Bipolar affective disorder, currently depressed, moderate ([facility] [address]  F31.32 296.52    2. PTSD (post-traumatic stress disorder)  F43.10 309.81    3. Complicated grief  A51.14 309.0        Assessment:   Fei Farfan is responding to treatment. Symptoms are improving overall. Discussed current medications and dosages.  Pt reports improved tolerability with use of seroquel 600 mg. No med changes made today. Reviewed current stressors and strategies for management of anxiety and grief. Discussed prn use of ambien and alprazolam. Pt reports he had decreased alprazolam to daily use but during services for his brother he states he was using bid. Reviewed prn use of benzos and pt aware of benzo safety. Last lithium level on 4/23/21 was in therapeutic range at 0.7. Reviewed treatment goals and target symptoms to monitor for. Plan:   1. Current Outpatient Medications   Medication Sig Dispense Refill    lithium carbonate 300 mg capsule TAKE TWO CAPSULES BY MOUTH EVERY NIGHT AT BEDTIME 60 Capsule 2    zolpidem (AMBIEN) 10 mg tablet TAKE ONE TABLET BY MOUTH AT BEDTIME AS NEEDED FOR SLEEP, DO NOT TAKE WITH OTHER BENZOS, NARCOTICS OR ALCOHOL 30 Tablet 0    QUEtiapine SR (SEROquel XR) 300 mg sr tablet TAKE TWO TABLETS BY MOUTH EVERY NIGHT AT BEDTIME 60 Tablet 2    ALPRAZolam (XANAX) 0.5 mg tablet TAKE ONE TABLET BY MOUTH TWICE A DAY, DO NOT TAKE WITH OTHER BENZOS,ALCOHOL OR NARCOTICS 60 Tablet 0    DULoxetine (CYMBALTA) 30 mg capsule Take 1 Capsule by mouth daily. 90 Capsule 0    fluticasone-salmeterol (ADVAIR DISKUS) 100-50 mcg/dose diskus inhaler Take 1 Puff by inhalation every twelve (12) hours.  loratadine (CLARITIN) 10 mg tablet Take 10 mg by mouth daily.  fentaNYL (DURAGESIC) 50 mcg/hr PATCH 1 Patch by TransDERmal route every fourty-eight (48) hours.  pregabalin (LYRICA) 150 mg capsule Take 150 mg by mouth three (3) times daily.  oxyCODONE IR (OXY-IR) 15 mg immediate release tablet Take 15 mg by mouth three (3) times daily as needed for Pain.  testosterone cypionate (DEPOTESTOTERONE CYPIONATE) 200 mg/mL injection by IntraMUSCular route every fourteen (14) days.  levothyroxine (SYNTHROID) 125 mcg tablet Take  by mouth Daily (before breakfast).       cholecalciferol, vitamin D3, (VITAMIN D3) 2,000 unit tab Take  by mouth.      metoprolol (LOPRESSOR) 25 mg tablet Take  by mouth daily.  rosuvastatin (CRESTOR) 40 mg tablet Take 40 mg by mouth nightly.  SUMAtriptan (IMITREX) 100 mg tablet Take 100 mg by mouth once as needed for Migraine.  polyethylene glycol (MIRALAX) 17 gram/dose powder Take 17 g by mouth daily as needed.  nitroglycerin (NITROSTAT) 0.4 mg SL tablet by SubLINGual route every five (5) minutes as needed for Chest Pain.            medication changes made today: cont lithium, cymbalta, seroquel, prn ambien and prn alprazolam    2. Counseling and coordination of care including instructions for treatment, risks/benefits, risk factor reduction and patient/family education. He agrees with the plan. Patient instructed to call with any side effects, questions or issues. 3.    Follow-up and Dispositions    · Return in about 3 months (around 12/23/2021). Silverio Argueta, who was evaluated through a synchronous (real-time) audio-video encounter, and/or his healthcare decision maker, is aware that it is a billable service, with coverage as determined by his insurance carrier. He provided verbal consent to proceed: Yes, and patient identification was verified. This visit was conducted pursuant to the emergency declaration under the [address] [place] [address], [address] [place] [address] authority and the [employer] Act. A caregiver was present when appropriate. Ability to conduct physical exam was limited. The patient was located in a state where the provider was credentialed to provide care.        9/23/2021  Cami Day NP

## 2021-10-04 DIAGNOSIS — F31.71 BIPOLAR DISORDER, IN PARTIAL REMISSION, MOST RECENT EPISODE HYPOMANIC (HCC): ICD-10-CM

## 2021-10-04 RX ORDER — ALPRAZOLAM 0.5 MG/1
TABLET ORAL
Qty: 60 TABLET | Refills: 0 | Status: SHIPPED | OUTPATIENT
Start: 2021-10-04 | End: 2021-11-16 | Stop reason: SDUPTHER

## 2021-10-14 DIAGNOSIS — F31.71 BIPOLAR DISORDER, IN PARTIAL REMISSION, MOST RECENT EPISODE HYPOMANIC (HCC): ICD-10-CM

## 2021-10-14 DIAGNOSIS — F43.10 PTSD (POST-TRAUMATIC STRESS DISORDER): ICD-10-CM

## 2021-10-18 ENCOUNTER — TELEPHONE (OUTPATIENT)
Dept: BEHAVIORAL/MENTAL HEALTH CLINIC | Age: 56
End: 2021-10-18

## 2021-10-18 RX ORDER — ZOLPIDEM TARTRATE 10 MG/1
TABLET ORAL
Qty: 30 TABLET | Refills: 0 | Status: SHIPPED | OUTPATIENT
Start: 2021-10-18 | End: 2021-11-15

## 2021-10-18 NOTE — TELEPHONE ENCOUNTER
[address] on track.     :    09/16/2021 09/16/2021 1   Zolpidem Tartrate 10 Mg Tablet  30.00 30 Pa All [id number] [place] (2331) 0 0.50 LME [place]    Thanks-    Trisha Guzman

## 2021-10-18 NOTE — TELEPHONE ENCOUNTER
Patient called to say he faxed DMV forms to be filled out by provider. He says sections E and F are to be completed by provider and faxed to [facility] at the number listed on the form's instructions. Patient requests callback once completed.

## 2021-11-14 DIAGNOSIS — F31.71 BIPOLAR DISORDER, IN PARTIAL REMISSION, MOST RECENT EPISODE HYPOMANIC (HCC): ICD-10-CM

## 2021-11-15 RX ORDER — ALPRAZOLAM 0.5 MG/1
TABLET ORAL
Qty: 60 TABLET | OUTPATIENT
Start: 2021-11-15

## 2021-11-16 ENCOUNTER — TELEPHONE (OUTPATIENT)
Dept: BEHAVIORAL/MENTAL HEALTH CLINIC | Age: 56
End: 2021-11-16

## 2021-11-16 DIAGNOSIS — F31.71 BIPOLAR DISORDER, IN PARTIAL REMISSION, MOST RECENT EPISODE HYPOMANIC (HCC): ICD-10-CM

## 2021-11-16 RX ORDER — ALPRAZOLAM 0.5 MG/1
TABLET ORAL
Qty: 60 TABLET | Refills: 0 | Status: SHIPPED | OUTPATIENT
Start: 2021-11-16 | End: 2022-01-19 | Stop reason: SDUPTHER

## 2021-11-16 NOTE — TELEPHONE ENCOUNTER
Last visit: 09.23.21  Next visit: 01/19/22    Medication was refused on 11/14/21. Patient called today informing PSR he is completely out.     :  10/04/2021 10/04/2021 1 Alprazolam 0.5 Mg Tablet 60.00 30 Pa All [id number] [facility] (9740) 0 2.00 LME   Comm Ins    09/02/2021 08/31/2021 1 Alprazolam 0.5 Mg Tablet 60.00 30 Pa All    07/19/2021 07/19/2021 1 Alprazolam 0.5 Mg Tablet 60.00 30 Pa All

## 2021-11-16 NOTE — TELEPHONE ENCOUNTER
Called pt to review prn use of alprazolam. Reinforced importance of prn use for safety concerns/safe use. Discussed risk of concurrent use of pain medication and benzo's. Pt aware of risk. Discussed goal of getting through Dec with this new refill/Rx. Discussed importance of ind therapy vs reliance on anxiety medication.

## 2021-11-16 NOTE — TELEPHONE ENCOUNTER
Requested Prescriptions     Pending Prescriptions Disp Refills    ALPRAZolam (XANAX) 0.5 mg tablet 60 Tablet 0     Sig: TAKE ONE TABLET BY MOUTH TWICE A DAY DO NOT TAKE WITH OTHER BENZOS ALCOHOL OR NARCOTICS     Last appt: 9/23  Next appt: 1/19    Patient says he is completely out of medication.

## 2021-11-20 DIAGNOSIS — F43.10 PTSD (POST-TRAUMATIC STRESS DISORDER): ICD-10-CM

## 2021-11-20 DIAGNOSIS — F31.71 BIPOLAR DISORDER, IN PARTIAL REMISSION, MOST RECENT EPISODE HYPOMANIC (HCC): ICD-10-CM

## 2021-11-22 RX ORDER — DULOXETIN HYDROCHLORIDE 30 MG/1
CAPSULE, DELAYED RELEASE ORAL
Qty: 90 CAPSULE | Refills: 0 | Status: SHIPPED | OUTPATIENT
Start: 2021-11-22 | End: 2022-02-18

## 2021-12-10 DIAGNOSIS — F31.71 BIPOLAR DISORDER, IN PARTIAL REMISSION, MOST RECENT EPISODE HYPOMANIC (HCC): ICD-10-CM

## 2021-12-10 DIAGNOSIS — F43.10 PTSD (POST-TRAUMATIC STRESS DISORDER): ICD-10-CM

## 2021-12-10 RX ORDER — QUETIAPINE 300 MG/1
TABLET, FILM COATED, EXTENDED RELEASE ORAL
Qty: 60 TABLET | Refills: 2 | Status: SHIPPED | OUTPATIENT
Start: 2021-12-10 | End: 2022-03-07

## 2021-12-18 DIAGNOSIS — F43.10 PTSD (POST-TRAUMATIC STRESS DISORDER): ICD-10-CM

## 2021-12-18 DIAGNOSIS — F31.71 BIPOLAR DISORDER, IN PARTIAL REMISSION, MOST RECENT EPISODE HYPOMANIC (HCC): ICD-10-CM

## 2021-12-20 RX ORDER — LITHIUM CARBONATE 300 MG/1
CAPSULE ORAL
Qty: 60 CAPSULE | Refills: 2 | Status: SHIPPED
Start: 2021-12-20 | End: 2022-01-19

## 2022-01-06 DIAGNOSIS — F31.71 BIPOLAR DISORDER, IN PARTIAL REMISSION, MOST RECENT EPISODE HYPOMANIC (HCC): ICD-10-CM

## 2022-01-06 DIAGNOSIS — F43.10 PTSD (POST-TRAUMATIC STRESS DISORDER): ICD-10-CM

## 2022-01-07 NOTE — TELEPHONE ENCOUNTER
Last visit: 09.23.21  Next visit: 01.19.22    :  12/12/2021 11/15/2021 1 Zolpidem Tartrate 10 Mg Tablet  30.00 30 Pa All  11/15/2021 11/15/2021 1 Zolpidem Tartrate 10 Mg Tablet 30.00 30 Pa All

## 2022-01-12 RX ORDER — ZOLPIDEM TARTRATE 10 MG/1
10 TABLET ORAL
Qty: 30 TABLET | Refills: 0 | Status: SHIPPED | OUTPATIENT
Start: 2022-01-12 | End: 2022-01-19 | Stop reason: SDUPTHER

## 2022-01-12 NOTE — TELEPHONE ENCOUNTER
Requested Prescriptions     Pending Prescriptions Disp Refills    zolpidem (AMBIEN) 10 mg tablet [Pharmacy Med Name: ZOLPIDEM TARTRATE 10 MG TABLET] 30 Tablet      Sig: TAKE ONE TABLET BY MOUTH EVERY NIGHT AT BEDTIME AS NEEDED FOR SLEEP DO NOT TAKE WITH OTHER BENZOS NARCOTICS OR ALCOHOL     Pt is calling to follow up on his refill request.  Pt advised it is too soon for refill, until end of week.

## 2022-01-19 ENCOUNTER — OFFICE VISIT (OUTPATIENT)
Dept: BEHAVIORAL/MENTAL HEALTH CLINIC | Age: 57
End: 2022-01-19
Payer: COMMERCIAL

## 2022-01-19 VITALS
BODY MASS INDEX: 27.4 KG/M2 | TEMPERATURE: 97.5 F | WEIGHT: 225 LBS | HEART RATE: 79 BPM | HEIGHT: 76 IN | DIASTOLIC BLOOD PRESSURE: 85 MMHG | OXYGEN SATURATION: 97 % | RESPIRATION RATE: 17 BRPM | SYSTOLIC BLOOD PRESSURE: 130 MMHG

## 2022-01-19 DIAGNOSIS — F43.21 COMPLICATED GRIEF: ICD-10-CM

## 2022-01-19 DIAGNOSIS — F31.71 BIPOLAR DISORDER, IN PARTIAL REMISSION, MOST RECENT EPISODE HYPOMANIC (HCC): ICD-10-CM

## 2022-01-19 DIAGNOSIS — F43.10 PTSD (POST-TRAUMATIC STRESS DISORDER): ICD-10-CM

## 2022-01-19 DIAGNOSIS — F31.32 BIPOLAR AFFECTIVE DISORDER, CURRENTLY DEPRESSED, MODERATE (HCC): Primary | ICD-10-CM

## 2022-01-19 PROCEDURE — 99214 OFFICE O/P EST MOD 30 MIN: CPT | Performed by: NURSE PRACTITIONER

## 2022-01-19 RX ORDER — ZOLPIDEM TARTRATE 10 MG/1
10 TABLET ORAL
Qty: 30 TABLET | Refills: 1 | Status: SHIPPED | OUTPATIENT
Start: 2022-01-19 | End: 2022-04-11

## 2022-01-19 RX ORDER — ALPRAZOLAM 0.5 MG/1
TABLET ORAL
Qty: 60 TABLET | Refills: 0 | Status: SHIPPED | OUTPATIENT
Start: 2022-01-19 | End: 2022-04-18

## 2022-01-19 NOTE — PROGRESS NOTES
Chief Complaint   Patient presents with    Medication Management     Visit Vitals  /85 (BP 1 Location: Left arm, BP Patient Position: Sitting, BP Cuff Size: Adult)   Pulse 79   Temp 97.5 °F (36.4 °C) (Temporal)   Resp 17   Ht 6' 4\" (1.93 m)   Wt 102.1 kg (225 lb)   SpO2 97%   BMI 27.39 kg/m²     Prior to Admission medications    Medication Sig Start Date End Date Taking? Authorizing Provider   zolpidem (AMBIEN) 10 mg tablet Take 1 Tablet by mouth nightly as needed for Sleep (Do not take with other benzo's, alcohol or narcotic pain medication. ). Max Daily Amount: 10 mg. 1/12/22  Yes Allegra Moreno NP   lithium carbonate 300 mg capsule TAKE TWO CAPSULES BY MOUTH EVERY NIGHT AT BEDTIME 12/20/21  Yes Allegra Moreno NP   QUEtiapine SR (SEROquel XR) 300 mg sr tablet TAKE TWO TABLETS BY MOUTH EVERY NIGHT AT BEDTIME 12/10/21  Yes Allegra Moreno NP   DULoxetine (CYMBALTA) 30 mg capsule TAKE ONE CAPSULE BY MOUTH DAILY 11/22/21  Yes Allegra Moreno NP   ALPRAZolam Tavo Paci) 0.5 mg tablet TAKE ONE TABLET BY MOUTH TWICE A DAY DO NOT TAKE WITH OTHER BENZOS ALCOHOL OR NARCOTICS 11/16/21  Yes Allegra Moreno NP   loratadine (CLARITIN) 10 mg tablet Take 10 mg by mouth daily. Yes Provider, Historical   fentaNYL (DURAGESIC) 50 mcg/hr PATCH 1 Patch by TransDERmal route every fourty-eight (48) hours. Yes Provider, Historical   pregabalin (LYRICA) 150 mg capsule Take 150 mg by mouth three (3) times daily. Yes Provider, Historical   oxyCODONE IR (OXY-IR) 15 mg immediate release tablet Take 15 mg by mouth three (3) times daily as needed for Pain. Yes Provider, Historical   testosterone cypionate (DEPOTESTOTERONE CYPIONATE) 200 mg/mL injection by IntraMUSCular route every fourteen (14) days. Yes Provider, Historical   levothyroxine (SYNTHROID) 125 mcg tablet Take 112 mcg by mouth Daily (before breakfast). Yes Provider, Historical   cholecalciferol, vitamin D3, (VITAMIN D3) 2,000 unit tab Take  by mouth.    Yes Provider, Historical   metoprolol (LOPRESSOR) 25 mg tablet Take  by mouth daily. Yes Provider, Historical   rosuvastatin (CRESTOR) 40 mg tablet Take 40 mg by mouth nightly. Yes Provider, Historical   SUMAtriptan (IMITREX) 100 mg tablet Take 100 mg by mouth once as needed for Migraine. Yes Provider, Historical   polyethylene glycol (MIRALAX) 17 gram/dose powder Take 17 g by mouth daily as needed. Yes Provider, Historical   nitroglycerin (NITROSTAT) 0.4 mg SL tablet by SubLINGual route every five (5) minutes as needed for Chest Pain. Yes Provider, Historical   fluticasone-salmeterol (ADVAIR DISKUS) 100-50 mcg/dose diskus inhaler Take 1 Puff by inhalation every twelve (12) hours.   Patient not taking: Reported on 1/19/2022    Provider, Historical

## 2022-01-19 NOTE — PROGRESS NOTES
CHIEF COMPLAINT:  Yanet Aguilar is a 65 y.o. male and was seen today for follow-up of psychiatric condition and psychotropic medication management. HPI:    Pam Garcia reports the following psychiatric symptoms by hx: hypomania/anette, depression and anxiety. Overall symptoms have been present for years. Currently depression and anxiety symptoms are of moderate/high severity. The symptoms occur more frequently and more severely overall. Pt reports he has been dealing with depression and anxiety. Pt reports medications are beneficial. Met with pt for appt today to review current treatment plan. FAMILY/SOCIAL HX: psychosocial stressors    REVIEW OF SYSTEMS:  Psychiatric symptoms being monitored for:  depression, anxiety, hypomania/anette  Appetite:good   Sleep: improved the last couple of weeks  Neuro: chronic pain    Visit Vitals  /85 (BP 1 Location: Left arm, BP Patient Position: Sitting, BP Cuff Size: Adult)   Pulse 79   Temp 97.5 °F (36.4 °C) (Temporal)   Resp 17   Ht 6' 4\" (1.93 m)   Wt 102.1 kg (225 lb)   SpO2 97%   BMI 27.39 kg/m²       Side Effects:  none    MENTAL STATUS EXAM:   Sensorium  oriented to time, place and person   Relations cooperative   Appearance:  age appropriate and casually dressed   Motor Behavior:  gait stable and within normal limits   Speech:  normal volume   Thought Process: goal directed   Thought Content free of delusions and free of hallucinations   Suicidal ideations no intention   Homicidal ideations no intention   Mood:  anxious and depressed   Affect:  anxious and depressed   Memory recent  adequate   Memory remote:  adequate   Concentration:  adequate   Abstraction:  abstract   Insight:  fair and good   Reliability good and fair   Judgment:  fair and good     MEDICAL DECISION MAKING:  Problems addressed today:    ICD-10-CM ICD-9-CM    1. Bipolar affective disorder, currently depressed, [facility] ([facility]  F31.32 296.52    2.  PTSD (post-traumatic stress disorder)  F43.10 309.81 zolpidem (AMBIEN) 10 mg tablet   3. Complicated grief  O57.24 309.0    4. Bipolar disorder, in partial remission, most recent episode hypomanic ([facility] [address]  F31.71 296.80 zolpidem (AMBIEN) 10 mg tablet      ALPRAZolam (XANAX) 0.5 mg tablet       Assessment:   Gerardo Rios is responding to treatment. Symptoms are stabilizing post a recent exacerbation of depression. Discussed current medications and dosages. Due to kidney issues will wean off of lithium. Discussed med options and will work to establish latuda and alos ultimately wean off of seroquel. Pt continues to work to reduce the daily use of alprazolam. He does use ambien nightly but sleep is highly dysregulated without it. Reviewed benzo safety and prn guidelines. Reviewed treatment goals and target symptoms to monitor for. Plan:   1. Current Outpatient Medications   Medication Sig Dispense Refill    lurasidone (Latuda) 40 mg tab tablet Take 1 Tablet by mouth daily (with dinner). 30 Tablet 2    zolpidem (AMBIEN) 10 mg tablet Take 1 Tablet by mouth nightly as needed for Sleep (Do not take with other benzo's, alcohol or narcotic pain medication. ). Max Daily Amount: 10 mg. 30 Tablet 1    ALPRAZolam (XANAX) 0.5 mg tablet TAKE ONE TABLET BY MOUTH TWICE A DAY DO NOT TAKE WITH OTHER BENZOS ALCOHOL OR NARCOTICS 60 Tablet 0    QUEtiapine SR (SEROquel XR) 300 mg sr tablet TAKE TWO TABLETS BY MOUTH EVERY NIGHT AT BEDTIME 60 Tablet 2    DULoxetine (CYMBALTA) 30 mg capsule TAKE ONE CAPSULE BY MOUTH DAILY 90 Capsule 0    loratadine (CLARITIN) 10 mg tablet Take 10 mg by mouth daily.  fentaNYL (DURAGESIC) 50 mcg/hr PATCH 1 Patch by TransDERmal route every fourty-eight (48) hours.  pregabalin (LYRICA) 150 mg capsule Take 150 mg by mouth three (3) times daily.  oxyCODONE IR (OXY-IR) 15 mg immediate release tablet Take 15 mg by mouth three (3) times daily as needed for Pain.       testosterone cypionate (DEPOTESTOTERONE CYPIONATE) 200 mg/mL injection by IntraMUSCular route every fourteen (14) days.  levothyroxine (SYNTHROID) 125 mcg tablet Take 112 mcg by mouth Daily (before breakfast).  cholecalciferol, vitamin D3, (VITAMIN D3) 2,000 unit tab Take  by mouth.  metoprolol (LOPRESSOR) 25 mg tablet Take  by mouth daily.  rosuvastatin (CRESTOR) 40 mg tablet Take 40 mg by mouth nightly.  SUMAtriptan (IMITREX) 100 mg tablet Take 100 mg by mouth once as needed for Migraine.  polyethylene glycol (MIRALAX) 17 gram/dose powder Take 17 g by mouth daily as needed.  nitroglycerin (NITROSTAT) 0.4 mg SL tablet by SubLINGual route every five (5) minutes as needed for Chest Pain.  fluticasone-salmeterol (ADVAIR DISKUS) 100-50 mcg/dose diskus inhaler Take 1 Puff by inhalation every twelve (12) hours. (Patient not taking: Reported on 1/19/2022)            medication changes made today: wean off of lithium, add latuda, cont seroquel with plan to wean off, cont low dose cymbalta, cont prn alprazolam and continue to work towards prn use and not daily use, cont ambien     2. Counseling and coordination of care including instructions for treatment, risks/benefits, risk factor reduction and patient/family education. He agrees with the plan. Patient instructed to call with any side effects, questions or issues. 3.    Follow-up and Dispositions    · Return in about 3 months (around 4/19/2022).          1/19/2022  Rosalina Arora NP

## 2022-02-04 ENCOUNTER — TELEPHONE (OUTPATIENT)
Dept: BEHAVIORAL/MENTAL HEALTH CLINIC | Age: 57
End: 2022-02-04

## 2022-02-04 NOTE — TELEPHONE ENCOUNTER
Pt calls to ask if he can go back on Lithium 300 or increase the Latuda. He says since coming off the Lithium completely he is \"out of sorts\" and feels he needs something more.      Next ov  4/21/22

## 2022-02-07 NOTE — TELEPHONE ENCOUNTER
Spoke to patient regarding increase in [place]. Patient stated that around 1.30.22 he took his last dose of Lithium. By the end of the week he was \"extremely manic. \"  Patient stated it was so bad that he started back on the Lithium 300mg as of 02.04.22. Patient encourage to discontinue as provider instructed due to kidney issues (11/2021 lab in chart). Patient stated he needs provider to increase Latuda or try another medication. Patient was insistent he would not dc Lithium until he had a medication adjustment. Patient  placed a call to his PCP this morning informing him that he started back on the Lithium and is requesting blood work. Patient also stated he has an appointment with a nephrologist on 02.16.22. Patient informed if he noticed a decrease in urine output or swelling to contact his PCP. Patient expressed an understanding and had no further questions. Will inform provider of above information.

## 2022-02-18 DIAGNOSIS — F43.10 PTSD (POST-TRAUMATIC STRESS DISORDER): ICD-10-CM

## 2022-02-18 DIAGNOSIS — F31.71 BIPOLAR DISORDER, IN PARTIAL REMISSION, MOST RECENT EPISODE HYPOMANIC (HCC): ICD-10-CM

## 2022-02-18 RX ORDER — DULOXETIN HYDROCHLORIDE 30 MG/1
CAPSULE, DELAYED RELEASE ORAL
Qty: 90 CAPSULE | Refills: 0 | Status: SHIPPED | OUTPATIENT
Start: 2022-02-18 | End: 2022-05-16

## 2022-03-04 DIAGNOSIS — F31.71 BIPOLAR DISORDER, IN PARTIAL REMISSION, MOST RECENT EPISODE HYPOMANIC (HCC): ICD-10-CM

## 2022-03-04 DIAGNOSIS — F43.10 PTSD (POST-TRAUMATIC STRESS DISORDER): ICD-10-CM

## 2022-03-07 RX ORDER — QUETIAPINE 300 MG/1
TABLET, FILM COATED, EXTENDED RELEASE ORAL
Qty: 60 TABLET | Refills: 2 | Status: SHIPPED | OUTPATIENT
Start: 2022-03-07

## 2022-03-17 RX ORDER — LITHIUM CARBONATE 300 MG/1
CAPSULE ORAL
Qty: 60 CAPSULE | OUTPATIENT
Start: 2022-03-17

## 2022-04-07 DIAGNOSIS — F31.71 BIPOLAR DISORDER, IN PARTIAL REMISSION, MOST RECENT EPISODE HYPOMANIC (HCC): ICD-10-CM

## 2022-04-07 DIAGNOSIS — F43.10 PTSD (POST-TRAUMATIC STRESS DISORDER): ICD-10-CM

## 2022-04-11 RX ORDER — ZOLPIDEM TARTRATE 10 MG/1
TABLET ORAL
Qty: 30 TABLET | Refills: 0 | Status: SHIPPED | OUTPATIENT
Start: 2022-04-11

## 2022-04-17 DIAGNOSIS — F31.71 BIPOLAR DISORDER, IN PARTIAL REMISSION, MOST RECENT EPISODE HYPOMANIC (HCC): ICD-10-CM

## 2022-04-18 RX ORDER — ALPRAZOLAM 0.5 MG/1
TABLET ORAL
Qty: 60 TABLET | Refills: 0 | Status: SHIPPED | OUTPATIENT
Start: 2022-04-18

## 2022-04-18 RX ORDER — LURASIDONE HYDROCHLORIDE 40 MG/1
TABLET, FILM COATED ORAL
Qty: 30 TABLET | Refills: 2 | Status: SHIPPED | OUTPATIENT
Start: 2022-04-18

## 2022-04-18 NOTE — TELEPHONE ENCOUNTER
Next visit: 04.21.22    : - No medication changes, just giving an update.   04/11/2022 04/11/2022 1 Zolpidem Tartrate 10 Mg Tablet 30.00 30 Pa All   04/07/2022 11/17/2021 1 Testosterone Cyp 200 Mg/ml 2.00 28 Ma Fen   03/31/2022 03/31/2022 1 Fentanyl 25 Mcg/hr Patch 10.00 35 Tu [facility]   03/31/2022 03/31/2022 1 Oxycodone Hcl 15 Mg Tablet 120.00 30 Tu [facility]      01/19/2022 01/19/2022 1 Alprazolam 0.5 Mg Tablet 60.00 30 Pa All [id number]

## 2022-05-15 DIAGNOSIS — F31.71 BIPOLAR DISORDER, IN PARTIAL REMISSION, MOST RECENT EPISODE HYPOMANIC (HCC): ICD-10-CM

## 2022-05-15 DIAGNOSIS — F43.10 PTSD (POST-TRAUMATIC STRESS DISORDER): ICD-10-CM

## 2022-05-16 RX ORDER — DULOXETIN HYDROCHLORIDE 30 MG/1
CAPSULE, DELAYED RELEASE ORAL
Qty: 90 CAPSULE | Refills: 1 | Status: SHIPPED | OUTPATIENT
Start: 2022-05-16

## 2024-04-20 ENCOUNTER — CLINICAL DOCUMENTATION (OUTPATIENT)
Age: 59
End: 2024-04-20

## 2024-04-20 NOTE — PROGRESS NOTES
Left message asking patient to contact to schedule NP appt-elevated LFT's -next available. Records received. Patient has Medicare information listed [id number] to verify.  Linda [id number]   Referring physician-Grecia Prado [address], [address], [place] [address] [phone]

## 2024-06-03 ENCOUNTER — CLINICAL DOCUMENTATION (OUTPATIENT)
Age: 59
End: 2024-06-03

## 2024-06-03 ENCOUNTER — TELEPHONE (OUTPATIENT)
Age: 59
End: 2024-06-03

## 2024-06-11 ENCOUNTER — TELEPHONE (OUTPATIENT)
Age: 59
End: 2024-06-11

## 2024-08-30 ENCOUNTER — TELEPHONE (OUTPATIENT)
Age: 59
End: 2024-08-30

## 2024-10-23 ENCOUNTER — OFFICE VISIT (OUTPATIENT)
Age: 59
End: 2024-10-23
Payer: MEDICARE

## 2024-10-23 ENCOUNTER — CLINICAL DOCUMENTATION (OUTPATIENT)
Age: 59
End: 2024-10-23

## 2024-10-23 ENCOUNTER — HOSPITAL ENCOUNTER (OUTPATIENT)
Facility: HOSPITAL | Age: 59
Setting detail: SPECIMEN
Discharge: HOME | End: 2024-10-26
Payer: MEDICARE

## 2024-10-23 VITALS
BODY MASS INDEX: 30.1 KG/M2 | WEIGHT: 247.2 LBS | HEART RATE: 73 BPM | SYSTOLIC BLOOD PRESSURE: 114 MMHG | HEIGHT: 76 IN | DIASTOLIC BLOOD PRESSURE: 77 MMHG | OXYGEN SATURATION: 98 % | TEMPERATURE: 96.4 F

## 2024-10-23 DIAGNOSIS — R74.8 ELEVATED LIVER ENZYMES: Primary | ICD-10-CM

## 2024-10-23 DIAGNOSIS — R74.8 ELEVATED LIVER ENZYMES: ICD-10-CM

## 2024-10-23 LAB
ALBUMIN SERPL-MCNC: 3.5 G/DL (ref 3.4–5)
ALBUMIN/GLOB SERPL: 1.1 (ref 0.8–1.7)
ALP SERPL-CCNC: 117 U/L (ref 45–117)
ALT SERPL-CCNC: 43 U/L (ref 16–61)
ANION GAP SERPL CALC-SCNC: 5 MMOL/L (ref 3–18)
AST SERPL-CCNC: 34 U/L (ref 10–38)
BASOPHILS # BLD: 0.1 K/UL (ref 0–0.1)
BASOPHILS NFR BLD: 1 % (ref 0–2)
BILIRUB DIRECT SERPL-MCNC: 0.2 MG/DL (ref 0–0.2)
BILIRUB SERPL-MCNC: 0.5 MG/DL (ref 0.2–1)
BUN SERPL-MCNC: 25 MG/DL (ref 7–18)
BUN/CREAT SERPL: 17 (ref 12–20)
CALCIUM SERPL-MCNC: 9.7 MG/DL (ref 8.5–10.1)
CHLORIDE SERPL-SCNC: 112 MMOL/L (ref 100–111)
CO2 SERPL-SCNC: 25 MMOL/L (ref 21–32)
CREAT SERPL-MCNC: 1.51 MG/DL (ref 0.6–1.3)
DIFFERENTIAL METHOD BLD: NORMAL
EOSINOPHIL # BLD: 0.3 K/UL (ref 0–0.4)
EOSINOPHIL NFR BLD: 4 % (ref 0–5)
ERYTHROCYTE [DISTWIDTH] IN BLOOD BY AUTOMATED COUNT: 12.7 % (ref 11.6–14.5)
GLOBULIN SER CALC-MCNC: 3.2 G/DL (ref 2–4)
GLUCOSE SERPL-MCNC: 119 MG/DL (ref 74–99)
HCT VFR BLD AUTO: 43.9 % (ref 36–48)
HGB BLD-MCNC: 14.4 G/DL (ref 13–16)
IMM GRANULOCYTES # BLD AUTO: 0 K/UL (ref 0–0.04)
IMM GRANULOCYTES NFR BLD AUTO: 0 % (ref 0–0.5)
LYMPHOCYTES # BLD: 2.4 K/UL (ref 0.9–3.6)
LYMPHOCYTES NFR BLD: 32 % (ref 21–52)
MCH RBC QN AUTO: 30 PG (ref 24–34)
MCHC RBC AUTO-ENTMCNC: 32.8 G/DL (ref 31–37)
MCV RBC AUTO: 91.5 FL (ref 78–100)
MONOCYTES # BLD: 0.5 K/UL (ref 0.05–1.2)
MONOCYTES NFR BLD: 7 % (ref 3–10)
NEUTS SEG # BLD: 4.1 K/UL (ref 1.8–8)
NEUTS SEG NFR BLD: 56 % (ref 40–73)
NRBC # BLD: 0 K/UL (ref 0–0.01)
NRBC BLD-RTO: 0 PER 100 WBC
PLATELET # BLD AUTO: 238 K/UL (ref 135–420)
PMV BLD AUTO: 9.8 FL (ref 9.2–11.8)
POTASSIUM SERPL-SCNC: 4.4 MMOL/L (ref 3.5–5.5)
PROT SERPL-MCNC: 6.7 G/DL (ref 6.4–8.2)
RBC # BLD AUTO: 4.8 M/UL (ref 4.35–5.65)
SODIUM SERPL-SCNC: 142 MMOL/L (ref 136–145)
WBC # BLD AUTO: 7.3 K/UL (ref 4.6–13.2)

## 2024-10-23 PROCEDURE — 1036F TOBACCO NON-USER: CPT | Performed by: NURSE PRACTITIONER

## 2024-10-23 PROCEDURE — 86381 MITOCHONDRIAL ANTIBODY EACH: CPT

## 2024-10-23 PROCEDURE — 91200 LIVER ELASTOGRAPHY: CPT | Performed by: NURSE PRACTITIONER

## 2024-10-23 PROCEDURE — G8427 DOCREV CUR MEDS BY ELIG CLIN: HCPCS | Performed by: NURSE PRACTITIONER

## 2024-10-23 PROCEDURE — 87340 HEPATITIS B SURFACE AG IA: CPT

## 2024-10-23 PROCEDURE — 85025 COMPLETE CBC W/AUTO DIFF WBC: CPT

## 2024-10-23 PROCEDURE — 3017F COLORECTAL CA SCREEN DOC REV: CPT | Performed by: NURSE PRACTITIONER

## 2024-10-23 PROCEDURE — G8417 CALC BMI ABV UP PARAM F/U: HCPCS | Performed by: NURSE PRACTITIONER

## 2024-10-23 PROCEDURE — 86015 ACTIN ANTIBODY EACH: CPT

## 2024-10-23 PROCEDURE — 86708 HEPATITIS A ANTIBODY: CPT

## 2024-10-23 PROCEDURE — 36415 COLL VENOUS BLD VENIPUNCTURE: CPT

## 2024-10-23 PROCEDURE — 86038 ANTINUCLEAR ANTIBODIES: CPT

## 2024-10-23 PROCEDURE — 80048 BASIC METABOLIC PNL TOTAL CA: CPT

## 2024-10-23 PROCEDURE — G8484 FLU IMMUNIZE NO ADMIN: HCPCS | Performed by: NURSE PRACTITIONER

## 2024-10-23 PROCEDURE — 86803 HEPATITIS C AB TEST: CPT

## 2024-10-23 PROCEDURE — 80076 HEPATIC FUNCTION PANEL: CPT

## 2024-10-23 PROCEDURE — 99204 OFFICE O/P NEW MOD 45 MIN: CPT | Performed by: NURSE PRACTITIONER

## 2024-10-23 PROCEDURE — 86706 HEP B SURFACE ANTIBODY: CPT

## 2024-10-23 RX ORDER — ALPHA LIPOIC ACID 600 MG
600 TABLET ORAL DAILY
COMMUNITY
Start: 2024-08-09 | End: 2025-08-09

## 2024-10-23 RX ORDER — EZETIMIBE 10 MG/1
1 TABLET ORAL DAILY
COMMUNITY
Start: 2024-08-06

## 2024-10-23 RX ORDER — GLIPIZIDE 10 MG/1
TABLET ORAL
COMMUNITY
Start: 2024-08-01

## 2024-10-23 RX ORDER — TIZANIDINE 2 MG/1
2 TABLET ORAL 3 TIMES DAILY
COMMUNITY
Start: 2024-10-02

## 2024-10-23 RX ORDER — DIAPER,BRIEF,ADULT, DISPOSABLE
500 EACH MISCELLANEOUS 2 TIMES DAILY
COMMUNITY
Start: 2024-08-09 | End: 2025-08-09

## 2024-10-23 RX ORDER — VITAMIN E 268 MG
400 CAPSULE ORAL DAILY
COMMUNITY
Start: 2024-08-09 | End: 2024-10-23

## 2024-10-23 RX ORDER — APIXABAN 5 MG/1
1 TABLET, FILM COATED ORAL 2 TIMES DAILY
COMMUNITY
Start: 2024-07-18

## 2024-10-23 RX ORDER — RIBOFLAVIN (VITAMIN B2) 400 MG
400 TABLET ORAL DAILY
COMMUNITY
Start: 2024-08-09 | End: 2024-10-23

## 2024-10-23 RX ORDER — FLUDROCORTISONE ACETATE 0.1 MG/1
0.15 TABLET ORAL DAILY
COMMUNITY
Start: 2023-11-14 | End: 2025-08-09

## 2024-10-23 RX ORDER — CANAGLIFLOZIN 300 MG/1
TABLET, FILM COATED ORAL
COMMUNITY
Start: 2024-09-05

## 2024-10-23 RX ORDER — GLIPIZIDE 10 MG/1
TABLET, FILM COATED, EXTENDED RELEASE ORAL
COMMUNITY

## 2024-10-23 RX ORDER — PYRIDOSTIGMINE BROMIDE 60 MG/1
30 TABLET ORAL 3 TIMES DAILY
COMMUNITY
Start: 2023-11-02 | End: 2025-08-09

## 2024-10-23 RX ORDER — TAMSULOSIN HYDROCHLORIDE 0.4 MG/1
CAPSULE ORAL
COMMUNITY
Start: 2024-01-25

## 2024-10-23 RX ORDER — GALCANEZUMAB 120 MG/ML
INJECTION, SOLUTION SUBCUTANEOUS
COMMUNITY
Start: 2024-10-21

## 2024-10-23 RX ORDER — ACETAMINOPHEN 500 MG
1000 TABLET ORAL EVERY 6 HOURS PRN
COMMUNITY

## 2024-10-23 RX ORDER — ALBUTEROL SULFATE 90 UG/1
2 INHALANT RESPIRATORY (INHALATION) EVERY 6 HOURS PRN
COMMUNITY

## 2024-10-23 RX ORDER — METOPROLOL SUCCINATE 50 MG/1
TABLET, EXTENDED RELEASE ORAL
COMMUNITY
Start: 2024-08-11

## 2024-10-23 RX ORDER — LANOLIN ALCOHOL/MO/W.PET/CERES
100 CREAM (GRAM) TOPICAL DAILY
COMMUNITY
Start: 2024-08-09 | End: 2025-08-09

## 2024-10-23 RX ORDER — BLOOD-GLUCOSE SENSOR
EACH MISCELLANEOUS
COMMUNITY
Start: 2024-08-08

## 2024-10-23 RX ORDER — OXYCODONE AND ACETAMINOPHEN 7.5; 325 MG/1; MG/1
TABLET ORAL
COMMUNITY
Start: 2024-07-25 | End: 2024-10-23

## 2024-10-23 RX ORDER — GLIPIZIDE 5 MG/1
10 TABLET, FILM COATED, EXTENDED RELEASE ORAL 2 TIMES DAILY
COMMUNITY
Start: 2024-01-16 | End: 2024-10-23

## 2024-10-23 RX ORDER — MIDODRINE HYDROCHLORIDE 5 MG/1
2.5 TABLET ORAL 3 TIMES DAILY
COMMUNITY
Start: 2024-08-02 | End: 2025-01-29

## 2024-10-23 NOTE — PROGRESS NOTES
[facility] LIVER [facility]     Larry Winchester MD, FACP, [facility], FAASLD   MD Yulia Yeh, TOM Winchester, [employer]   Stacy Atkinson, [facility]   Gladis Jase, [facility]  Jhony Santiago, [facility]   Nikki Enciso, [employer]   Yareli Arceon, [facility]-BC      Liver [facility]   at [facility]   [address], [address]   [place]  [address] [phone]   FAX: [phone]  [facility]   [address], [address]   [place]  [address] [phone]   FAX: [phone]     Patient Care Team:  Juan J Gray DO as PCP - General    Patient Active Problem List   Diagnosis    Elevated liver enzymes     The clinicians listed above have asked me to see Prince Luo in consultation regarding elevated liver enzymes and its management. All medical records sent by the referring physicians were reviewed   including pathology.      The patient is a 59 y.o. male who was found to have elevated liver enzymes in 4/2024.      Serologic evaluation for markers of chronic liver disease has either not been performed or the results are not available.      Imaging of the liver was either not performed or the results are not available to me.      Assessment of liver fibrosis with Fibroscan was performed in the office today. The result was 8.0 kPa which correlates with stage 2 fibrosis. The CAP score of 243 does not suggest hepatic steatosis.    The patient had not started any new medications within 3 months preceding the elevation in liver chemistries.    The patient does not have any symptoms which could be attributed to the liver disorder    The patient is not experiencing the following symptoms which are commonly seen in this liver disorder: fatigue, pain in the right side over the liver    The patient completes all daily activities without any

## 2024-10-23 NOTE — PROGRESS NOTES
Request for LBX slides from [facility] per  was faxed over on 10/23/24.    Confirmation of fax has been scanned to media. SDF

## 2024-10-24 LAB
HAV AB SER QL IA: NEGATIVE
HBV SURFACE AB SER QL IA: NEGATIVE
HBV SURFACE AB SERPL IA-ACNC: <3.1 MIU/ML
HBV SURFACE AG SER QL: 0.16 INDEX
HBV SURFACE AG SER QL: NEGATIVE
HCV AB SERPL QL IA: NORMAL
HCV IGG SERPL QL IA: NON REACTIVE S/CO RATIO
HEP BS AB COMMENT: ABNORMAL

## 2024-10-25 LAB
MITOCHONDRIA M2 IGG SER-ACNC: <20 UNITS (ref 0–20)
SMA IGG SER-ACNC: 49 UNITS (ref 0–19)

## 2024-11-01 LAB
ANA BY IFA: POSITIVE
Lab: ABNORMAL
SPECKLED PATTERN: ABNORMAL

## 2025-08-04 ENCOUNTER — ANESTHESIA (OUTPATIENT)
Facility: HOSPITAL | Age: 60
End: 2025-08-04
Payer: MEDICARE

## 2025-08-04 ENCOUNTER — ANESTHESIA EVENT (OUTPATIENT)
Facility: HOSPITAL | Age: 60
End: 2025-08-04
Payer: MEDICARE

## 2025-08-04 ENCOUNTER — HOSPITAL ENCOUNTER (OUTPATIENT)
Facility: HOSPITAL | Age: 60
Setting detail: OUTPATIENT SURGERY
Discharge: HOME | End: 2025-08-04
Attending: INTERNAL MEDICINE | Admitting: INTERNAL MEDICINE
Payer: MEDICARE

## 2025-08-04 VITALS
OXYGEN SATURATION: 100 % | BODY MASS INDEX: 29.71 KG/M2 | RESPIRATION RATE: 18 BRPM | TEMPERATURE: 99.2 F | DIASTOLIC BLOOD PRESSURE: 68 MMHG | SYSTOLIC BLOOD PRESSURE: 116 MMHG | HEART RATE: 67 BPM | HEIGHT: 76 IN | WEIGHT: 244 LBS

## 2025-08-04 PROCEDURE — 3700000001 HC ADD 15 MINUTES (ANESTHESIA): Performed by: INTERNAL MEDICINE

## 2025-08-04 PROCEDURE — 7100000011 HC PHASE II RECOVERY - ADDTL 15 MIN: Performed by: INTERNAL MEDICINE

## 2025-08-04 PROCEDURE — 3600007502: Performed by: INTERNAL MEDICINE

## 2025-08-04 PROCEDURE — 6360000002 HC RX W HCPCS: Performed by: NURSE ANESTHETIST, CERTIFIED REGISTERED

## 2025-08-04 PROCEDURE — 2709999900 HC NON-CHARGEABLE SUPPLY: Performed by: INTERNAL MEDICINE

## 2025-08-04 PROCEDURE — 2500000003 HC RX 250 WO HCPCS: Performed by: NURSE ANESTHETIST, CERTIFIED REGISTERED

## 2025-08-04 PROCEDURE — 7100000010 HC PHASE II RECOVERY - FIRST 15 MIN: Performed by: INTERNAL MEDICINE

## 2025-08-04 PROCEDURE — 3600007512: Performed by: INTERNAL MEDICINE

## 2025-08-04 PROCEDURE — 2580000003 HC RX 258: Performed by: INTERNAL MEDICINE

## 2025-08-04 PROCEDURE — 88305 TISSUE EXAM BY PATHOLOGIST: CPT

## 2025-08-04 PROCEDURE — 3700000000 HC ANESTHESIA ATTENDED CARE: Performed by: INTERNAL MEDICINE

## 2025-08-04 RX ORDER — TAMSULOSIN HYDROCHLORIDE 0.4 MG/1
0.4 CAPSULE ORAL DAILY
COMMUNITY

## 2025-08-04 RX ORDER — LIDOCAINE HYDROCHLORIDE 20 MG/ML
INJECTION, SOLUTION EPIDURAL; INFILTRATION; INTRACAUDAL; PERINEURAL
Status: DISCONTINUED | OUTPATIENT
Start: 2025-08-04 | End: 2025-08-04 | Stop reason: SDUPTHER

## 2025-08-04 RX ORDER — DEXMEDETOMIDINE HYDROCHLORIDE 100 UG/ML
INJECTION, SOLUTION INTRAVENOUS
Status: DISCONTINUED | OUTPATIENT
Start: 2025-08-04 | End: 2025-08-04 | Stop reason: SDUPTHER

## 2025-08-04 RX ORDER — SODIUM CHLORIDE 9 MG/ML
INJECTION, SOLUTION INTRAVENOUS CONTINUOUS
Status: DISCONTINUED | OUTPATIENT
Start: 2025-08-04 | End: 2025-08-04 | Stop reason: HOSPADM

## 2025-08-04 RX ORDER — GLYCOPYRROLATE 0.2 MG/ML
INJECTION INTRAMUSCULAR; INTRAVENOUS
Status: DISCONTINUED | OUTPATIENT
Start: 2025-08-04 | End: 2025-08-04 | Stop reason: SDUPTHER

## 2025-08-04 RX ORDER — PROPOFOL 10 MG/ML
INJECTION, EMULSION INTRAVENOUS
Status: DISCONTINUED | OUTPATIENT
Start: 2025-08-04 | End: 2025-08-04 | Stop reason: SDUPTHER

## 2025-08-04 RX ADMIN — PROPOFOL 20 MG: 10 INJECTION, EMULSION INTRAVENOUS at 12:03

## 2025-08-04 RX ADMIN — PROPOFOL 20 MG: 10 INJECTION, EMULSION INTRAVENOUS at 12:00

## 2025-08-04 RX ADMIN — PROPOFOL 40 MG: 10 INJECTION, EMULSION INTRAVENOUS at 11:49

## 2025-08-04 RX ADMIN — PROPOFOL 30 MG: 10 INJECTION, EMULSION INTRAVENOUS at 11:53

## 2025-08-04 RX ADMIN — DEXMEDETOMIDINE HYDROCHLORIDE 6 MCG: 100 INJECTION, SOLUTION INTRAVENOUS at 11:46

## 2025-08-04 RX ADMIN — PROPOFOL 20 MG: 10 INJECTION, EMULSION INTRAVENOUS at 11:58

## 2025-08-04 RX ADMIN — SODIUM CHLORIDE: 0.9 INJECTION, SOLUTION INTRAVENOUS at 11:40

## 2025-08-04 RX ADMIN — PROPOFOL 20 MG: 10 INJECTION, EMULSION INTRAVENOUS at 11:55

## 2025-08-04 RX ADMIN — PROPOFOL 20 MG: 10 INJECTION, EMULSION INTRAVENOUS at 11:59

## 2025-08-04 RX ADMIN — LIDOCAINE HYDROCHLORIDE 60 MG: 20 INJECTION, SOLUTION EPIDURAL; INFILTRATION; INTRACAUDAL; PERINEURAL at 11:47

## 2025-08-04 RX ADMIN — PROPOFOL 20 MG: 10 INJECTION, EMULSION INTRAVENOUS at 12:01

## 2025-08-04 RX ADMIN — PROPOFOL 20 MG: 10 INJECTION, EMULSION INTRAVENOUS at 11:56

## 2025-08-04 RX ADMIN — PROPOFOL 100 MG: 10 INJECTION, EMULSION INTRAVENOUS at 11:47

## 2025-08-04 RX ADMIN — PROPOFOL 30 MG: 10 INJECTION, EMULSION INTRAVENOUS at 11:51

## 2025-08-04 RX ADMIN — PROPOFOL 20 MG: 10 INJECTION, EMULSION INTRAVENOUS at 12:07

## 2025-08-04 RX ADMIN — PROPOFOL 20 MG: 10 INJECTION, EMULSION INTRAVENOUS at 11:57

## 2025-08-04 RX ADMIN — GLYCOPYRROLATE 0.1 MG: 0.2 INJECTION, SOLUTION INTRAMUSCULAR; INTRAVENOUS at 11:46

## 2025-08-04 RX ADMIN — PROPOFOL 20 MG: 10 INJECTION, EMULSION INTRAVENOUS at 12:05

## 2025-08-04 ASSESSMENT — PAIN - FUNCTIONAL ASSESSMENT
PAIN_FUNCTIONAL_ASSESSMENT: NONE - DENIES PAIN
PAIN_FUNCTIONAL_ASSESSMENT: NONE - DENIES PAIN
PAIN_FUNCTIONAL_ASSESSMENT: 0-10
PAIN_FUNCTIONAL_ASSESSMENT: NONE - DENIES PAIN

## 2025-08-04 ASSESSMENT — PAIN DESCRIPTION - DESCRIPTORS: DESCRIPTORS: ACHING

## (undated) DEVICE — SYRINGE MED 5ML STD CLR PLAS LUERLOCK TIP N CTRL DISP

## (undated) DEVICE — NEEDLE MBO BLUNTFLL 18GX1.5STDLRMONOJCT

## (undated) DEVICE — SYRINGE MED 60ML PLAS ECC TIP N CTRL DISP

## (undated) DEVICE — CATHETER IV 22GA L1IN BLU POLYUR STR HUB RADPQ PROTCT +

## (undated) DEVICE — SNARE POLYP SM W13MMXL240CM SHTH DIA2.4MM OVL FLX DISP

## (undated) DEVICE — TOURNIQUET PHLEB W1XL18IN BLU FLAT RL AND BND REUSE FOR IV

## (undated) DEVICE — SYRINGE MEDICAL 3ML CLEAR PLASTIC STANDARD NON CONTROL LUERLOCK TIP DISPOSABLE

## (undated) DEVICE — Device

## (undated) DEVICE — NEEDLE SYRINGE 18GA L1.5IN RED PLAS HUB S STL BLNT FILL W/O

## (undated) DEVICE — TUBING SCTION CONN 1/4X12 RIB

## (undated) DEVICE — CANNULA CUSH AD W/ 14FT TBG

## (undated) DEVICE — FORCEPS BX CAP 240CM L RAD JAW 4

## (undated) DEVICE — MOUTHPIECE ENDOSCP L CTRL OPN AND SIDE PORTS DISP

## (undated) DEVICE — SOLUTION IV 1000ML 20MEQ K CHL IN 0.9% SOD CHL FLX CONT

## (undated) DEVICE — CATHETER PH SUCT 14FR

## (undated) DEVICE — WRISTBAND ID AD W2.5XL9.5CM RED VYN ADH CLSR UNI-PRINT 655-16-PDJ

## (undated) DEVICE — SYRINGE MED 50ML LUERSLIP TIP

## (undated) DEVICE — ELECTRODE EKG 1 3/8X17/8IN SQ SHP AD FOAM BK SNAP CONN GEL

## (undated) DEVICE — MANIFOLD SUCT 4 PRT 2 CANSTR FLTR DISP NEPTUNE 2

## (undated) DEVICE — SURGICAL PROCEDURE KIT ENDOSCP CUST COMPLIANCE

## (undated) DEVICE — EJECTOR SALIVA 6 IN FLX CLR